# Patient Record
Sex: FEMALE | Race: WHITE
[De-identification: names, ages, dates, MRNs, and addresses within clinical notes are randomized per-mention and may not be internally consistent; named-entity substitution may affect disease eponyms.]

---

## 2017-04-16 ENCOUNTER — HOSPITAL ENCOUNTER (EMERGENCY)
Dept: HOSPITAL 75 - ER | Age: 57
Discharge: HOME | End: 2017-04-16
Payer: COMMERCIAL

## 2017-04-16 VITALS — WEIGHT: 160 LBS | BODY MASS INDEX: 25.71 KG/M2 | HEIGHT: 66 IN

## 2017-04-16 VITALS — SYSTOLIC BLOOD PRESSURE: 118 MMHG | DIASTOLIC BLOOD PRESSURE: 81 MMHG

## 2017-04-16 DIAGNOSIS — J40: ICD-10-CM

## 2017-04-16 DIAGNOSIS — J06.9: Primary | ICD-10-CM

## 2017-04-16 DIAGNOSIS — F17.210: ICD-10-CM

## 2017-04-16 PROCEDURE — 99282 EMERGENCY DEPT VISIT SF MDM: CPT

## 2017-04-16 PROCEDURE — 71020: CPT

## 2017-04-16 PROCEDURE — 87430 STREP A AG IA: CPT

## 2017-04-16 PROCEDURE — 94640 AIRWAY INHALATION TREATMENT: CPT

## 2017-04-16 NOTE — DIAGNOSTIC IMAGING REPORT
INDICATION: Cough, congestion and wheezing



EXAMINATION: PA and lateral views of the chest.



FINDINGS: The heart size and vascularity are normal. Lungs are

clear. There is no effusion. There is no acute bony abnormality.



IMPRESSION: No acute abnormality is seen.



Dictated by:



Dictated on workstation # [id number]

## 2017-04-16 NOTE — ED COUGH/URI
General


Chief Complaint:  Cough/Cold/Flu Symptoms


Stated Complaint:  COUGH SORE THROAT FEVER


Source:  patient


Exam Limitations:  no limitations





History of Present Illness


Time seen by provider:  20:02


Initial Comments


Patient presents with a few weeks of cough, shortness of breath, sore throat, 

hoarseness, no fever or chills but some post tussive emesis. She smokes one 

pack a day states that she does not have COPD. She is followed recently with 

[facility]. No sick contacts or travel outside the [place] 

[place]. No skin rash. She has had headaches for the past 2 or 3 days and 

a fullness in her ears.





Allergies and Home Medications


Allergies


Coded Allergies:  


     No Known Drug Allergies (Unverified , 4/16/17)





Home Medications


Clonazepam 0.5 Mg Tablet, 0.5 MG PO BID PRN for prn, (Reported)


Cyclobenzaprine HCl 10 Mg Tablet, 10 MG PO BID, #60 (Reported)


Diclofenac/Misoprostol 1 Tab Tab, Unknown Dose PO UD, (Reported)


Gabapentin 600 Mg Tablet, 600 MG PO TID PRN for prn, (Reported)


Oxycodone HCl/Acetaminophen 1 Each Tablet, 1 EACH PO DAILY PRN for prn, (

Reported)





Constitutional:  No chills, No diaphoresis, No fever, malaise


EENTM:  hoarseness, mouth swelling, tearing, throat swelling, No ear discharge, 

No ear pain


Respiratory:  cough, phlegm, short of breath


Cardiovascular:  No Hx of Intervention, No palpitations, No syncope


Gastrointestinal:  No abdominal pain, No constipation, No diarrhea, nausea, No 

vomiting


Genitourinary:  No dysuria, No hematuria


Skin:  No pruritus, No rash





Past Medical-Social-Family Hx


Patient Social History


Alcohol Use:  Rarely Uses


Smoking Status:  Current Everyday Smoker (1 ppd)


Type Used:  Cigarettes


Recent Foreign Travel:  No


Contact w/Someone Who Travel:  No





Physical Exam


Vital Signs





Vital Sign - Last 12Hours








 4/16/17





 19:53


 


Temp 97.4


 


Pulse 81


 


Resp 20


 


B/P (MAP) 137/90


 


Pulse Ox 99


 


O2 Delivery Room Air





Capillary Refill :


General Appearance:  WD/WN, mild distress


Eyes:  Bilateral Eye EOMI, Bilateral Eye Normal Inspection, Bilateral Eye PERRL


HEENT:  TM abnormal (L) (retracted), pharyngeal erythema


Neck:  full range of motion, supple, normal inspection, tender midline


Respiratory:  chest non-tender, normal breath sounds, wheezing (few scattered)


Cardiovascular:  normal peripheral pulses, regular rate, rhythm, no edema, no 

murmur


Gastrointestinal:  normal bowel sounds, non tender, soft


Neurologic/Psychiatric:  alert, oriented x 3


Skin:  normal color, warm/dry





Progress/Results/Core Measures


Results/Orders


Lab Results





Laboratory Tests








Test


  4/16/17


20:05 Range/Units


 


 


Group A Streptococcus Screen NEGATIVE  NEGATIVE  








My Orders





Orders - SHREYAS ORTEGA


Rapid Strep A Screen (4/16/17 20:12)


Chest Pa/Lat (2 View) (4/16/17 20:12)


Albuterol Pre-Mix Nebs (Rt) (Proventil P (4/16/17 20:15)





Vital Signs/I&O





Vital Sign - Last 12Hours








 4/16/17 4/16/17 4/16/17





 19:53 19:53 20:20


 


Temp  97.4 


 


Pulse  81 


 


Resp  20 


 


B/P (MAP)  137/90 


 


Pulse Ox  99 98


 


O2 Delivery Room Air Room Air 








Progress Note :  


   Time:  20:11


Progress Note


Patient with long-standing smoking history now with upper respirator symptoms 

and some wheezing. We'll obtain a chest x-ray gave her a breathing treatment 

and send her out on appropriate therapy. Flonase for her eustachian 

dysfunction. Strep screen





Diagnostic Imaging





   Diagonstic Imaging:  Xray


   Plain Films/CT/US/NM/MRI:  chest


Comments


10 ribs above the non-flattened diaphragm with normal looking lung parenchyma. 

No signs of edema or other cardiopulmonary abnormality acutely. She does not 

have any other osseous or soft tissue findings acutely.


   Reviewed:  Reviewed by Me





Departure


Impression


Impression:  


 Primary Impression:  


 Upper respiratory infection


 Qualified Codes:  J06.9 - Acute upper respiratory infection, unspecified; 

B97.89 - Other viral agents as the cause of diseases classified elsewhere


 Additional Impression:  


 Bronchitis


Disposition:  01 HOME, SELF-CARE


Condition:  Stable





Departure-Patient Inst.


Decision time for Depature:  20:35


Referrals:  


JESSEE BUTT MD (PCP)


Primary Care Physician


Patient Instructions:  Acute Bronchitis, Adult (DC)





Add. Discharge Instructions:  


You have been diagnosed with an upper respiratory tract infection most likely 

viral. There is been a negative strep screen however in 2 days you can call 

back up to the hospital and get the results of the culture to see if it was 

indeed positive for strep. If this is the case then you should fill the 

antibiotic prescription that was sent out, azithromycin and take it as 

prescribed. If you're getting worse symptoms of the next 2-3 days then you 

should fill the azithromycin as well. Otherwise you have albuterol to be used 

if you're having wheezing or tightness in her chest. You have Tessalon Perles 

be taken every 6 hours if you're having a cough. You've also been given a short 

course of prednisone to be taken over the next 5 days. It is okay to spread it 

out every other day if you're feeling wired on the redness on as this is a 

normal side effect to steroids in general. The symptoms usually resolve in 7-14 

days. If you're having new or worsening or worrisome symptoms you should return 

to the ER or to your primary care physician.








All discharge instructions reviewed with patient and/or family. Voiced 

understanding.


Scripts


Benzonatate (Tessalon Perle) 100 Mg Capsule


100 MG PO Q6H Y for COUGH, #30 CAP 0 Refills


   Prov: SHREYAS ORTEGA         4/16/17 


Albuterol Sulfate (PROAIR HFA) 1 Puff Puff


2 PUFF IH Q4H Y for WHEEZING, #1 EACH 0 Refills


   1 PUFF = 90 MCG


   Prov: SHREYAS ORTEGA         4/16/17 


Prednisone (Prednisone) 20 Mg Tab


20 MG PO DAILY for 5 Days, #5 TAB 0 Refills


   Prov: SHREYAS ORTEGA         4/16/17 


Azithromycin (Azithromycin) 250 Mg Tablet


250 MG PO UD, #6 TAB 0 Refills


   TAKE 2 TABLETS ON DAY ONE THEN TAKE 1 TABLET DAILY FOR FOUR MORE DAYS


   Prov: SHREYAS ORTEGA         4/16/17





Copy


Copies To 1:   MADISON BURCIAGA DO











SHREYAS ORTEGA Apr 16, 2017 20:12

## 2017-05-07 ENCOUNTER — HOSPITAL ENCOUNTER (EMERGENCY)
Dept: HOSPITAL 75 - ER | Age: 57
Discharge: HOME | End: 2017-05-07
Payer: MEDICAID

## 2017-05-07 VITALS — SYSTOLIC BLOOD PRESSURE: 140 MMHG | DIASTOLIC BLOOD PRESSURE: 81 MMHG

## 2017-05-07 VITALS — WEIGHT: 150 LBS | HEIGHT: 66 IN | BODY MASS INDEX: 24.11 KG/M2

## 2017-05-07 DIAGNOSIS — J40: Primary | ICD-10-CM

## 2017-05-07 DIAGNOSIS — Z79.899: ICD-10-CM

## 2017-05-07 DIAGNOSIS — F17.210: ICD-10-CM

## 2017-05-07 PROCEDURE — 71020: CPT

## 2017-05-07 PROCEDURE — 94640 AIRWAY INHALATION TREATMENT: CPT

## 2017-05-07 PROCEDURE — 87804 INFLUENZA ASSAY W/OPTIC: CPT

## 2017-05-07 PROCEDURE — 99282 EMERGENCY DEPT VISIT SF MDM: CPT

## 2017-05-07 NOTE — ED COUGH/URI
General


Chief Complaint:  Cough/Cold/Flu Symptoms


Stated Complaint:  COUGH


Nursing Triage Note:  


ILL SINCE THURSDAY, COUGHING, FEVER.  COUGHING UP GREEN.  COMPLETED STEROIDS 

AND 


ANTIBIOTICS


Source:  patient





History of Present Illness


Time seen by provider:  11:05


Initial Comments


C/O PRODUCTIVE COUGH X 2 WEEKS--GREEN SPUTUM


PT HAS HAD SUBJECTIVE FEVER AND SWEATS SINCE THURSDAY 


C/O SHORTNESS OF BREATH AND WHEEZING


WAS SEEN HERE 04/16/17 FOR THIS PROBLEM AND WAS GIVEN RX FOR PREDNISONE, 

TESSALON, ALBUTEROL AND ZITHROMAX--STATES SYMPTOMS GOT BETTER, THEN WORSENED 

WHEN MEDICATIONS WERE FINISHED





PCP: DR. RAJEEV BUTT





Allergies and Home Medications


Allergies


Coded Allergies:  


     morphine (Unverified  Allergy, Unknown, 5/7/17)





Home Medications


Albuterol Sulfate 1 Puff Puff, 2 PUFF IH Q4H PRN for WHEEZING, #1 Ref 0


   1 PUFF = 90 MCG 


   Prescribed by: SHREYAS ORTEGA on 4/16/17 2049


Azithromycin 250 Mg Tablet, 250 MG PO UD, #6 Ref 0


   TAKE 2 TABLETS ON DAY ONE THEN TAKE 1 TABLET DAILY FOR FOUR MORE DAYS 


   Prescribed by: SHREYAS ORTEGA on 4/16/17 2049


Benzonatate 100 Mg Capsule, 100 MG PO Q6H PRN for COUGH, #30 Ref 0


   Prescribed by: SHREYAS ORTEGA on 4/16/17 2049


Clonazepam 0.5 Mg Tablet, 0.5 MG PO BID PRN for prn, (Reported)


Cyclobenzaprine HCl 10 Mg Tablet, 10 MG PO BID, #60 (Reported)


Diclofenac/Misoprostol 1 Tab Tab, Unknown Dose PO UD, (Reported)


Gabapentin 600 Mg Tablet, 600 MG PO TID PRN for prn, (Reported)


Ondansetron HCl 4 Mg Tab, 4 MG PO Q6H PRN for NAUSEA/VOMITING-1ST LINE, #20 Ref 

0


   Prescribed by: SHREYAS ORTEGA on 4/16/17 2049


Oxycodone HCl/Acetaminophen 1 Each Tablet, 1 EACH PO DAILY PRN for prn, (

Reported)


Prednisone 20 Mg Tab, 20 MG PO DAILY for 5 Days, #5 Ref 0


   Prescribed by: SHREYAS ORTEGA on 4/16/17 2049





Constitutional:  see HPI, fever


EENTM:  nose congestion, see HPI


Respiratory:  see HPI, cough, short of breath, wheezing


Cardiovascular:  no symptoms reported


Gastrointestinal:  no symptoms reported


Genitourinary:  no symptoms reported


Musculoskeletal:  no symptoms reported


Skin:  no symptoms reported


Psychiatric/Neurological:  No Symptoms Reported


Hematologic/Lymphatic:  No Symptoms Reported


Immunological/Allergic:  no symptoms reported





Past Medical-Social-Family Hx


Patient Social History


Alcohol Use:  Rarely Uses


Recreational Drug Use:  No (PAST HISTORY)


Smoking Status:  Current Everyday Smoker (1 PPD)


Type Used:  Cigarettes


Recent Foreign Travel:  No


Contact w/Someone Who Travel:  No


Recent Infectious Disease Expo:  No


Recent Hopitalizations:  No





Immunizations Up To Date


Tetanus Booster (TDap):  Unknown


PED Vaccines UTD:  Yes





Seasonal Allergies


Seasonal Allergies:  No





Surgeries


HX Surgeries:  Yes (BACK, ROTATOR CUFF)


Surgeries:  Appendectomy, Orthopedic





Respiratory


Hx Respiratory Disorders:  No





Cardiovascular


Hx Cardiac Disorders:  No





Neurological


Hx Neurological Disorders:  No





Reproductive System


Hx Reproductive Disorders:  No





Genitourinary


Hx Genitourinary Disorders:  No





Gastrointestinal


Hx Gastrointestinal Disorders:  No





Musculoskeletal


Hx Musculoskeletal Disorders:  Yes


Musculoskeletal Disorders:  Arthritis, Chronic Back Pain





Endocrine


Hx Endocrine Disorders:  No





HEENT


HX ENT Disorders:  No





Cancer


Hx Cancer:  No





Psychosocial


Hx Psychiatric Problems:  No





Integumentary


HX Skin/Integumentary Disorder:  No





Blood Transfusions


Hx Blood Disorders:  No





Physical Exam


Vital Signs





Vital Sign - Last 12Hours








 5/7/17





 11:06


 


Temp 98.3


 


Pulse 93


 


Resp 24


 


B/P (MAP) 145/100


 


Pulse Ox 96


 


O2 Delivery Room Air





Capillary Refill : Less Than 3 Seconds


General Appearance:  WD/WN, no apparent distress, other (CONTINUOUS HARSH COUGH

, + ODOR OF CIGARETTES)


HEENT:  PERRL/EOMI, other (NASAL MUCOSAL EDEMA)


Respiratory:  no respiratory distress, no accessory muscle use, wheezing (

AUDIBLE EXPIRATORY WHEEZING), other (FREQUENT TIGHT/HARSH COUGH)


Cardiovascular:  regular rate, rhythm, no murmur


Gastrointestinal:  non tender, soft


Extremities:  no pedal edema, normal capillary refill


Neurologic/Psychiatric:  CNs II-XII nml as tested, no motor/sensory deficits, 

alert, oriented x 3, other (ANXIOUS)


Skin:  normal color, warm/dry





Progress/Results/Core Measures


Results/Orders


Micro Results





Microbiology


5/7/17 Influenza Types A,B Antigen (MARILEE) - Final, Complete


         





My Orders





Orders - MELANY WARNER DO


Albuterol/Ipra Inhalation Soln (Duoneb I (5/7/17 11:05)


Albuterol Pre-Mix Nebs (Rt) (Proventil P (5/7/17 11:07)


Prednisone Tablet (Deltasone Tablet) (5/7/17 11:15)


Chest Pa/Lat (2 View) (5/7/17 11:11)


Influenza A And B Antigens (5/7/17 11:11)


Albuterol/Ipra Inhalation Soln (Duoneb I (5/7/17 11:15)


Dexamethasone Injection (Decadron Inject (5/7/17 11:15)


Rt Request For Service (5/7/17 11:11)


Svn Sm Volume Nebulizer Rt-Rfs (5/7/17 11:11)


Dexamethasone Pf Injection (Decadron Pf (5/7/17 11:07)


Ondansetron  Oral Dissolve Tab (Zofran (5/7/17 11:30)


Ondansetron  Oral Dissolve Tab (Zofran (5/7/17 11:15)





Medications Given in ED





Current Medications








 Medications  Dose


 Ordered  Sig/Kaden


 Route  Start Time


 Stop Time Status Last Admin


Dose Admin


 


 Albuterol Sulfate  2.5 mg  STK-MED ONCE


 .ROUTE  5/7/17 11:07


 5/7/17 11:11 DC 5/7/17 11:22


2.5 MG


 


 Albuterol/


 Ipratropium  3 ml  ONCE  ONCE


 INH  5/7/17 11:15


 5/7/17 11:16 DC 5/7/17 11:19


3 ML


 


 Dexamethasone


 Sodium Phosphate  20 mg  ONCE  ONCE


 IH  5/7/17 11:15


 5/7/17 11:16 DC 5/7/17 11:22


20 MG


 


 Ondansetron HCl  4 mg  ONCE  ONCE


 PO  5/7/17 11:30


 5/7/17 11:31 DC 5/7/17 11:21


4 MG








Vital Signs/I&O





Vital Sign - Last 12Hours








 5/7/17 5/7/17 5/7/17





 11:06 11:06 11:23


 


Temp  98.3 


 


Pulse  93 


 


Resp  24 


 


B/P (MAP)  145/100 


 


Pulse Ox  96 95


 


O2 Delivery Room Air  














Blood Pressure Mean:  115








Progress Note :  


Progress Note


LUNGS ESSENTIALLY CLEAR AFTER NEB TREATMENT AND COUGH DECREASED





DOES HAVE ACCESS TO NEBULIZER AT HOME, AND WOULD LIKE RX FOR MEDICATION FOR IT





Diagnostic Imaging





Comments


CXR--NO ACUTE PROCESS, PER RADIOLOGIST REPORT @ 1140


   Reviewed:  Reviewed by Me





Departure


Impression


Impression:  


 Primary Impression:  


 Bronchitis


Disposition:  01 HOME, SELF-CARE


Condition:  Improved





Departure-Patient Inst.


Referrals:  


JESSEE BUTT MD (PCP/Family)


Primary Care Physician


Patient Instructions:  Acute Bronchitis, Adult (DC)





Add. Discharge Instructions:  


NO SMOKING





TYLENOL AND MOTRIN AS NEEDED FOR PAIN OR FEVER





FOLLOW UP WITH DR. BUTT IN 2-3 DAYS FOR FURTHER CARE





All discharge instructions reviewed with patient and/or family. Voiced 

understanding.


Scripts


Benzonatate (Tessalon Perle) 100 Mg Capsule


1-2 TAB PO TID for Cough, #30 CAP


   Prov: MELANY WARNER DO         5/7/17 


Budesonide (Pulmicort) 1 Mg/2 Ml Ampul.neb


1 MG IH BID, #1 INHALER


   Prov: MELANY WARNER DO         5/7/17 


D-Methorphan Hb/Prometh HCl (Promethazine-Dm Syrup) 118 Ml Syrup


1-2 TSP PO Q4H for Cough, #120 ML


   Prov: MELANY WARNER DO         5/7/17 


Methylprednisolone (Medrol) 4 Mg Tab.ds.pk


4 MG PO UD, #1 PKG


   Prov: MELANY WARNER DO         5/7/17 


Levofloxacin (Levaquin) 500 Mg Tablet


500 MG PO DAILY for INFECTION, #10 TAB


   Prov: MELANY WARNER DO         5/7/17 


Albuterol Sulfate (Albuterol Sulfate) 2.5 Mg/3 Ml Vial.neb


2.5 MG IH Q4H, #1 EA


   Prov: MELANY WARNER DO         5/7/17











MELANY WARNER DO May 7, 2017 11:29

## 2017-05-07 NOTE — DIAGNOSTIC IMAGING REPORT
EXAMINATION: PA and lateral chest at 1140 AM



INDICATION: Cough



The heart size is within normal limits and stable when compared

to 4/16/17. The lungs are clear. There is still no evidence for

failure, pneumonia or for a pleural effusion to suggest an acute

abnormality. There is a very small area of slightly increased

density overlying the left lung base on the PA view. This is

probably secondary to superimposition.



The mediastinum is not widened. The osseous structures are

intact.



IMPRESSION: There is no evidence for an acute cardiopulmonary

abnormality.



Dictated by: 



  Dictated on workstation # [id number]

## 2017-08-25 ENCOUNTER — HOSPITAL ENCOUNTER (OUTPATIENT)
Dept: HOSPITAL 75 - RAD | Age: 57
End: 2017-08-25
Attending: FAMILY MEDICINE
Payer: MEDICAID

## 2017-08-25 DIAGNOSIS — Z12.31: Primary | ICD-10-CM

## 2017-08-25 PROCEDURE — 77067 SCR MAMMO BI INCL CAD: CPT

## 2017-09-12 NOTE — DIAGNOSTIC IMAGING REPORT
Bilateral screening mammogram 2D views with tomosynthesis



The current study was also evaluated with a Computer Aided

Detection (CAD) system.



Indication: Screening. No current complaints stated on the

questionnaire.



COMPARISON: 3/19/15



FINDINGS:

The breasts are composed of heterogeneously dense parenchyma

which may decrease mammographic sensitivity. There are

benign-appearing calcifications seen. Allowing for technique and

positional differences, no suspicious change is seen.



IMPRESSION: No significant change.



ACR BI-RADS Category 2: Benign findings.

Result letter will be mailed to the patient.

Note: At least 10% of breast cancer is not imaged by mammography.



Dictated by: 



  Dictated on workstation # [id number]

## 2017-12-03 ENCOUNTER — HOSPITAL ENCOUNTER (EMERGENCY)
Dept: HOSPITAL 75 - ER | Age: 57
Discharge: HOME | End: 2017-12-03
Payer: MEDICAID

## 2017-12-03 VITALS — BODY MASS INDEX: 24.11 KG/M2 | WEIGHT: 150 LBS | HEIGHT: 66 IN

## 2017-12-03 VITALS — DIASTOLIC BLOOD PRESSURE: 89 MMHG | SYSTOLIC BLOOD PRESSURE: 124 MMHG

## 2017-12-03 DIAGNOSIS — Z90.49: ICD-10-CM

## 2017-12-03 DIAGNOSIS — M19.90: ICD-10-CM

## 2017-12-03 DIAGNOSIS — L02.413: Primary | ICD-10-CM

## 2017-12-03 PROCEDURE — 99282 EMERGENCY DEPT VISIT SF MDM: CPT

## 2017-12-03 PROCEDURE — 87186 SC STD MICRODIL/AGAR DIL: CPT

## 2017-12-03 PROCEDURE — 87070 CULTURE OTHR SPECIMN AEROBIC: CPT

## 2017-12-03 PROCEDURE — 87205 SMEAR GRAM STAIN: CPT

## 2017-12-03 PROCEDURE — 87077 CULTURE AEROBIC IDENTIFY: CPT

## 2017-12-03 NOTE — XMS REPORT
[facility]

 Created on: 08/15/2017



Cabrera Arvizu

External Reference #: [id number]

: 1960

Sex: Female



Demographics







 Address  [address] [place]  [address]

 

 Preferred Language  Unknown

 

 Marital Status  Unknown

 

 Restoration Affiliation  Unknown

 

 Race  Unknown

 

 Ethnic Group  Unknown





Author







 Author  LICO SILVA

 

 [facility]

 

 Address  [address] [place]  [address]



 

 Phone  [phone]







Care Team Providers







 Care Team Member Name  Role  Phone

 

 LICO SILVA  Unavailable  [phone]







PROBLEMS







 Type  Condition  ICD9-CM Code  IAB70-WG Code  Onset Dates  Condition Status  
SNOMED Code

 

 Problem  Establishing care with new doctor, encounter for     Z71.89     
Active  [id number]

 

 Problem  Depression     F32.9     Active  16805859

 

 Problem  Stress headaches     F45.41     Active  91029490

 

 Problem  Chronic pain due to injury     G89.21     Active  535124991

 

 Problem  Anxiety     F41.9     Active  10456181







ALLERGIES

Unknown Allergies



SOCIAL HISTORY

No smoking Hx information available



PLAN OF CARE





VITAL SIGNS





MEDICATIONS







 Medication  Instructions  Dosage  Frequency  Start Date  End Date  Duration  
Status

 

 Klonopin 0.5 MG  Orally Twice a day  1 tablet  12h        28 days  Active







RESULTS

No Results



PROCEDURES

No Known procedures



IMMUNIZATIONS

No Known Immunizations
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
## 2017-12-03 NOTE — XMS REPORT
[facility]

 Created on: 2017



Nolberto  Cabrera

External Reference #: [id number]

: 1960

Sex: Female



Demographics







 Address  [address] [place]  [address]

 

 Preferred Language  Unknown

 

 Marital Status  Unknown

 

 Roman Catholic Affiliation  Unknown

 

 Race  Unknown

 

 Ethnic Group  Unknown





Author







 Author  LICO SILVA

 

 [facility]

 

 Address  [address] [place]  [address]



 

 Phone  [phone]







Care Team Providers







 Care Team Member Name  Role  Phone

 

 LICO SILVA  Unavailable  [phone]







PROBLEMS







 Type  Condition  ICD9-CM Code  SJJ31-TM Code  Onset Dates  Condition Status  
SNOMED Code

 

 Problem  Establishing care with new doctor, encounter for     Z71.89     
Active  [id number]

 

 Problem  Depression     F32.9     Active  46273331

 

 Problem  Stress headaches     F45.41     Active  62224837

 

 Problem  Chronic pain due to injury     G89.21     Active  954902358

 

 Problem  Anxiety     F41.9     Active  74722655







ALLERGIES







 Substance  Reaction  Event Type  Date  Status

 

 Morphine Sulfate  nausea  Drug Allergy  27 Dec, 2016  Active







SOCIAL HISTORY

No smoking Hx information available



PLAN OF CARE







 Activity  Details









  









 Follow Up  prn Reason:







VITAL SIGNS







 Height  65 in  2016

 

 Weight  167.2 lbs  2016

 

 Temperature  98.4 degrees Fahrenheit  2016

 

 Heart Rate  80 bpm  2016

 

 Respiratory Rate  20   2016

 

 BMI  27.82 kg/m2  2016

 

 Blood pressure systolic  114 mmHg  2016

 

 Blood pressure diastolic  80 mmHg  2016







MEDICATIONS







 Medication  Instructions  Dosage  Frequency  Start Date  End Date  Duration  
Status

 

 Cyclobenzaprine HCl 10 MG     TAKE ONE TABLET BY MOUTH TWICE DAILY           
30  Active

 

 Klonopin 0.5 MG  Orally Twice a day  1 tablet  12h        28 days  Active

 

 Arthrotec 75-0.2 MG  Orally Once a day  1 tablet  24h  25 Oct, 2016     90 
days  Active

 

 Gabapentin 800 MG  Orally 3 times a day  1 tablet  8h        30 days  Active

 

 Cymbalta 60 mg  Orally Once a day  1 capsule  24h       90 days  
Active

 

 Hydrocodone-Acetaminophen 5-325 MG  Orally Once a day  1 tablet as needed  24h
  13 Dec, 2016     28 days  Active







RESULTS







 Name  Result  Date  Reference Range

 

 PDF Report     2016   

 

 PDF Report1  [facility]      

 

 PAP TEST W/ HPV REGARDLESS     2016   

 

 DIAGNOSIS:         

 

 Specimen adequacy:         

 

 Clinician provided ICD10:         

 

 Performed by:         

 

 .  .      

 

 Note:         

 

 HPV, high-risk  Negative     Negative

 

 HEMOCCULT (IN HOUSE)     2016   

 

 RESULTS  Negative      

 

 Control  +      

 

 Lot #  [id number]      

 

 Exp date  Oct 2017      

 

 Mammogram, Bilateral Screening     2017   







PROCEDURES







 Procedure  Date Ordered  Related Diagnosis  Body Site

 

 SPECIMEN HANDLING  Dec 27, 2016      

 

 TEST FOR BLOOD, FECES  Dec 27, 2016      

 

 Office Visit, Est Pt., Level 4  Dec 27, 2016      







IMMUNIZATIONS

No Known Immunizations

## 2017-12-03 NOTE — XMS REPORT
[facility]

 Created on: 2017



Cabrera Arvizu

External Reference #: [id number]

: 1960

Sex: Female



Demographics







 Address  [address] [place]  [address]

 

 Preferred Language  Unknown

 

 Marital Status  Unknown

 

 Jewish Affiliation  Unknown

 

 Race  Unknown

 

 Ethnic Group  Unknown





Author







 Author  LICO SILVA

 

 [facility]

 

 Address  [address] [place]  [address]



 

 Phone  [phone]







Care Team Providers







 Care Team Member Name  Role  Phone

 

 LICO SILVA  Unavailable  [phone]







PROBLEMS







 Type  Condition  ICD9-CM Code  QTD77-HS Code  Onset Dates  Condition Status  
SNOMED Code

 

 Problem  Establishing care with new doctor, encounter for     Z71.89     
Active  [id number]

 

 Problem  Depression     F32.9     Active  01440982

 

 Problem  Stress headaches     F45.41     Active  04497867

 

 Problem  Chronic pain due to injury     G89.21     Active  222008674

 

 Problem  Anxiety     F41.9     Active  24370596







ALLERGIES

Unknown Allergies



SOCIAL HISTORY

No smoking Hx information available



PLAN OF CARE





VITAL SIGNS





MEDICATIONS

Unknown Medications



RESULTS

No Results



PROCEDURES

No Known procedures



IMMUNIZATIONS

No Known Immunizations

## 2017-12-03 NOTE — ED INTEGUMENTARY GENERAL
General


Chief Complaint:  Skin/Wound Problems


Stated Complaint:  R ARM KNOT/WOUND


Source:  patient


Exam Limitations:  no limitations





History of Present Illness


Time seen by provider:  11:13


Initial Comments


ER with a one-week history of bumps to the right armpit that are very painful 

and red.  A few weeks ago she had a similar presentation to the left armpit.  

No fevers or chills.


Timing/Duration:  constant


Severity:  moderate





Allergies and Home Medications


Allergies


Coded Allergies:  


     morphine (Unverified  Allergy, Unknown, 5/7/17)





Home Medications


Albuterol Sulfate 1 Puff Puff, 2 PUFF IH Q4H PRN for WHEEZING, #1 Ref 0


   1 PUFF = 90 MCG 


   Prescribed by: SHREYAS ORTEGA on 4/16/17 2049


Albuterol Sulfate 2.5 Mg/3 Ml Vial.neb, 2.5 MG IH Q4H, #1


   Prescribed by: MELANY WARNER on 5/7/17 1154


Azithromycin 250 Mg Tablet, 250 MG PO UD, #6 Ref 0


   TAKE 2 TABLETS ON DAY ONE THEN TAKE 1 TABLET DAILY FOR FOUR MORE DAYS 


   Prescribed by: SHREYAS ORTEGA on 4/16/17 2049


Benzonatate 100 Mg Capsule, 100 MG PO Q6H PRN for COUGH, #30 Ref 0


   Prescribed by: SHREYAS ORTEGA on 4/16/17 2049


Benzonatate 100 Mg Capsule, 1-2 TAB PO TID, #30


   Prescribed by: MELANY WARNER on 5/7/17 1154


Budesonide 1 Mg/2 Ml Ampul.neb, 1 MG IH BID, #1


   Prescribed by: MELANY WARNER on 5/7/17 1154


Clonazepam 0.5 Mg Tablet, 0.5 MG PO BID PRN for prn, (Reported)


Cyclobenzaprine HCl 10 Mg Tablet, 10 MG PO BID, #60 (Reported)


D-Methorphan Hb/Prometh HCl 118 Ml Syrup, 1-2 TSP PO Q4H, #120


   Prescribed by: MELANY WARNER on 5/7/17 1154


Diclofenac/Misoprostol 1 Tab Tab, Unknown Dose PO UD, (Reported)


Gabapentin 600 Mg Tablet, 600 MG PO TID PRN for prn, (Reported)


Levofloxacin 500 Mg Tablet, 500 MG PO DAILY, #10


   Prescribed by: MELANY WARNER on 5/7/17 1154


Methylprednisolone 4 Mg Tab.ds.pk, 4 MG PO UD, #1


   Prescribed by: MELANY WARNER on 5/7/17 1154


Ondansetron HCl 4 Mg Tab, 4 MG PO Q6H PRN for NAUSEA/VOMITING-1ST LINE, #20 Ref 

0


   Prescribed by: SHREYAS ORTEGA on 4/16/17 2049


Oxycodone HCl/Acetaminophen 1 Each Tablet, 1 EACH PO DAILY PRN for prn, (

Reported)


Prednisone 20 Mg Tab, 20 MG PO DAILY for 5 Days, #5 Ref 0


   Prescribed by: SHREYAS ORTEGA on 4/16/17 2049


Sulfamethoxazole/Trimethoprim 1 Each Tablet, 1 EACH PO BID, #14


   Prescribed by: RACQUEL MUÑIZ on 12/3/17 1116





Constitutional:  see HPI


EENTM:  see HPI


Respiratory:  no symptoms reported


Cardiovascular:  no symptoms reported


Genitourinary:  no symptoms reported


Musculoskeletal:  no symptoms reported


Skin:  see HPI


Psychiatric/Neurological:  No Symptoms Reported


Endocrine:  No Symptoms Reported





Past Medical-Social-Family Hx


Patient Social History


Type Used:  Cigarettes


Recent Foreign Travel:  No


Contact w/Someone Who Travel:  No


Recent Hopitalizations:  No





Immunizations Up To Date


Tetanus Booster (TDap):  Unknown


PED Vaccines UTD:  Yes





Seasonal Allergies


Seasonal Allergies:  No





Surgeries


Surgeries:  Appendectomy, Orthopedic





Reproductive System


Hx Reproductive Disorders:  No





Musculoskeletal


Musculoskeletal Disorders:  Arthritis, Chronic Back Pain





Physical Exam


Vital Signs





Vital Sign - Last 12Hours








 12/3/17





 11:13


 


Temp 97.0


 


Pulse 80


 


Resp 22


 


B/P (MAP) 124/89 (101)


 


Pulse Ox 100


 


O2 Delivery Room Air





Capillary Refill :


General Appearance:  WD/WN, no apparent distress


HEENT:  PERRL/EOMI, normal ENT inspection


Neck:  non-tender, full range of motion


Respiratory:  no respiratory distress, no accessory muscle use


Gastrointestinal:  non tender, soft


Neurologic/Psychiatric:  alert, normal mood/affect, oriented x 3


Skin:  normal color, warm/dry


Skin Problem Location:  upper extremities


Skin Problem Character:  abscess





I&D :  


   Blade Size:  11


Progress


Anesthetized with 1 mL of 2 percent lidocaine without epinephrine.  Wound then 

incised with an 11 blade scalpel.  Moderate amount of purulent material 

expressed.  Culture collected and sent to lab.  Wound gently irrigated with 

Betadine/saline solution.  Covered with gauze.





Progress/Results/Core Measures


Results/Orders


My Orders





Orders - RACQUEL MUÑIZ


Lidocaine 2% Injection 20 Ml (Xylocaine (12/3/17 11:15)


Wound Culture (12/3/17 11:11)





Medications Given in ED





Current Medications








 Medications  Dose


 Ordered  Sig/Kaden


 Route  Start Time


 Stop Time Status Last Admin


Dose Admin


 


 Lidocaine HCl  2 ml  ONCE  ONCE


 INJ  12/3/17 11:15


 12/3/17 11:16 DC 12/3/17 11:24


2 ML








Vital Signs/I&O





Vital Sign - Last 12Hours








 12/3/17 12/3/17





 11:13 11:24


 


Temp 97.0 97.0


 


Pulse 80 


 


Resp 22 


 


B/P (MAP) 124/89 (101) 


 


Pulse Ox 100 


 


O2 Delivery Room Air 











Departure


Impression


Impression:  


 Primary Impression:  


 Abscess


Disposition:  01 HOME, SELF-CARE


Condition:  Stable





Departure-Patient Inst.


Decision time for Depature:  11:14


Referrals:  


BRIANNA RICO DO (PCP/Family)


Primary Care Physician


Patient Instructions:  Abscess Incision and Drainage (DC)





Add. Discharge Instructions:  


1.  Return to ER for any concerns such as increasing redness pain or fevers.  

Take antibiotics as directed.  Follow-up with your doctor next week


All discharge instructions reviewed with patient and/or family. Voiced 

understanding.


Scripts


Sulfamethoxazole/Trimethoprim (Bactrim Ds Tablet) 1 Each Tablet


1 EACH PO BID, #14 TAB


   Prov: RACQUEL MUÑIZ         12/3/17











RACQUEL MUÑIZ Dec 3, 2017 11:16

## 2017-12-03 NOTE — XMS REPORT
[facility]

 Created on: 2017



Nolberto  Cabrera

External Reference #: [id number]

: 1960

Sex: Female



Demographics







 Address  [address] [place]  [address]

 

 Preferred Language  Unknown

 

 Marital Status  Unknown

 

 Mosque Affiliation  Unknown

 

 Race  Unknown

 

 Ethnic Group  Unknown





Author







 Author  LICO SILVA

 

 [facility]

 

 Address  [address] [place]  [address]



 

 Phone  [phone]







Care Team Providers







 Care Team Member Name  Role  Phone

 

 LICO SILVA  Unavailable  [phone]







PROBLEMS







 Type  Condition  ICD9-CM Code  UQM75-UU Code  Onset Dates  Condition Status  
SNOMED Code

 

 Problem  Establishing care with new doctor, encounter for     Z71.89     
Active  [id number]

 

 Problem  Depression     F32.9     Active  [id number]

 

 Problem  Stress headaches     F45.41     Active  18554426

 

 Problem  Chronic pain due to injury     G89.21     Active  285509020

 

 Problem  Anxiety     F41.9     Active  [id number]







ALLERGIES







 Substance  Reaction  Event Type  Date  Status

 

 Morphine Sulfate  nausea  Drug Allergy  14 Dec, 2016  Active







SOCIAL HISTORY

No smoking Hx information available



PLAN OF CARE







 Activity  Details









  









 Follow Up  4 Months Reason:Pain mgmt







VITAL SIGNS







 Height  65 in  2016

 

 Weight  167.1 lbs  2016

 

 Temperature  97.9 degrees Fahrenheit  2016

 

 Heart Rate  84 bpm  2016

 

 Respiratory Rate  20   2016

 

 BMI  27.80 kg/m2  2016

 

 Blood pressure systolic  122 mmHg  2016

 

 Blood pressure diastolic  82 mmHg  2016







MEDICATIONS







 Medication  Instructions  Dosage  Frequency  Start Date  End Date  Duration  
Status

 

 Gabapentin 800 MG  Orally 3 times a day  1 tablet  8h        30 days  Active

 

 Cyclobenzaprine HCl 10 MG     TAKE ONE TABLET BY MOUTH TWICE DAILY           
30  Active

 

 Klonopin 0.5 MG  Orally Twice a day  1 tablet  12h        28 days  Active

 

 Cymbalta 60 mg  Orally Once a day  1 capsule  24h       90 days  
Active

 

 Hydrocodone-Acetaminophen 5-325 MG  Orally Once a day  1 tablet as needed  24h
  13 Dec, 2016     28 days  Active

 

 Arthrotec 75-0.2 MG  Orally Once a day  1 tablet  24h  25 Oct, 2016     90 
days  Active







RESULTS







 Name  Result  Date  Reference Range

 

 AMERITOX     2016   







PROCEDURES







 Procedure  Date Ordered  Related Diagnosis  Body Site

 

 CRYOTHERAPY OF SKIN  2016  N/A   

 

 CRYOTHERAPY OF SKIN  Dec 14, 2016      

 

 No Charge  Dec 14, 2016      

 

 Office Visit, Est Pt., Level 3  Dec 14, 2016      







IMMUNIZATIONS

No Known Immunizations
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
## 2018-03-22 ENCOUNTER — HOSPITAL ENCOUNTER (OUTPATIENT)
Dept: HOSPITAL 75 - RAD | Age: 58
End: 2018-03-22
Attending: NURSE PRACTITIONER
Payer: MEDICAID

## 2018-03-22 DIAGNOSIS — R10.32: Primary | ICD-10-CM

## 2018-03-22 PROCEDURE — 74018 RADEX ABDOMEN 1 VIEW: CPT

## 2018-03-22 NOTE — DIAGNOSTIC IMAGING REPORT
INDICATION: Acute left lower quadrant pain.



TIME OF EXAM: 9:42 AM



FINDINGS: Single view of the abdomen was obtained. The bowel gas

pattern is nonobstructed. No pathologic calcifications are

identified. No free air is identified on this single view. There

are degenerative changes in the lower lumbar spine.



IMPRESSION: No acute abnormality detected.



Called to Leticia at 10:35 by cvb.



Dictated by: 



  Dictated on workstation # [id number]

## 2018-04-16 ENCOUNTER — HOSPITAL ENCOUNTER (OUTPATIENT)
Dept: HOSPITAL 75 - RAD | Age: 58
End: 2018-04-16
Attending: NURSE PRACTITIONER
Payer: MEDICAID

## 2018-04-16 DIAGNOSIS — M25.512: ICD-10-CM

## 2018-04-16 DIAGNOSIS — M51.36: ICD-10-CM

## 2018-04-16 DIAGNOSIS — M48.04: ICD-10-CM

## 2018-04-16 DIAGNOSIS — M46.84: ICD-10-CM

## 2018-04-16 DIAGNOSIS — M51.27: ICD-10-CM

## 2018-04-16 DIAGNOSIS — Z98.890: ICD-10-CM

## 2018-04-16 DIAGNOSIS — M46.86: ICD-10-CM

## 2018-04-16 DIAGNOSIS — M51.24: ICD-10-CM

## 2018-04-16 DIAGNOSIS — M48.061: Primary | ICD-10-CM

## 2018-04-16 PROCEDURE — 73221 MRI JOINT UPR EXTREM W/O DYE: CPT

## 2018-04-16 PROCEDURE — 72148 MRI LUMBAR SPINE W/O DYE: CPT

## 2018-04-16 NOTE — DIAGNOSTIC IMAGING REPORT
PROCEDURE: MRI left upper extremity without contrast.



TECHNIQUE: Multiplanar, multisequence MR imaging of the left

shoulder was performed without contrast.



COMPARISON: None available.



INDICATION: Left shoulder pain. Prior rotator cuff repair.



FINDINGS:

Rotator cuff: There are surgical changes from superior cuff

repair which include numerous soft tissue foci of susceptibility

artifact. Additionally there appear to be three soft tissue

anchor tracts within the mid humeral head, and there is potential

that the central soft tissue anchor may have been pulled free

from its osseous anchor site. There is no recurrent

full-thickness rotator cuff tear. Heterogeneous signal within the

repaired posterior supraspinatus and anterior infraspinatus may

relate to prior surgery and debridement. No rotator cuff muscle

atrophy.



Glenoid labrum: By non-arthrogram imaging, the glenoid labrum

appears intact. No para-labral cyst.



Long head of biceps: Long head of biceps is normally positioned

within the bicipital groove. The intracapsular segment is intact.



Bones and cartilage: Humeral head is normal in morphology without

fracture or focal osseous lesion.  No glenohumeral

chondromalacia. Mild degenerative capsular hypertrophy of the

acromioclavicular joint.



Soft tissues: No glenohumeral joint effusion. No MRI findings to

suggest adhesive capsulitis. No fluid or inflammatory like signal

within the subacromial/subdeltoid space to indicate bursitis.



IMPRESSION: 

1. Status post repair of the superior rotator cuff without

recurrent full-thickness tear. One of the soft tissue anchors in

the humeral head may have pulled free from its osseous anchoring

site and be located within the repaired posterior supraspinatus.

CT of the shoulder without contrast could be performed to further

assess this potential abnormality.

2. Heterogeneous signal and thinning of the repaired

supraspinatus is likely postoperative in nature and associated

with prior debridement.

3. Long head of biceps remains intact.



Dictated by: 



  Dictated on workstation # [id number]

## 2018-04-16 NOTE — DIAGNOSTIC IMAGING REPORT
CLINICAL INDICATION: Patient has chronic low back pain and

history of previous low back surgery in 2004.



EXAMINATION: MRI of the lumbar spine performed without IV

contrast. Sequences include sagittal T2, sagittal T1, sagittal T2

fat-sat, and axial T2.



COMPARISON: None.



FINDINGS: There is no evidence of acute lumbar spine fracture or

dislocation. There is a moderate amount of Modic type I

degenerative signal changes involving the right L4-L5 endplates

and small amount involving the right anterior T11-T12 endplates.

Remainder of the lumbar vertebra have normal T1-T2 signal. There

is no significant paraspinal soft tissue abnormality. The distal

thoracic spinal cord, conus medullaris, and cauda equina are

unremarkable. The conus medullaris tip is seen at the upper L1

vertebral body level.



There are mild to moderately hypertrophic spurs seen throughout

the thoracolumbar spine. There is mild right curvature of the

lumbar spine.



T11-T12: There is a diffuse disc bulge with a superimposed left

paracentral disc protrusion/herniation which causes mild central

canal narrowing. There is no significant neural foramen

narrowing. There is mild right facet arthropathy.



T12-L1: There is no significant central spinal canal or neural

foramen narrowing.



L1-L2: There are small anterior spurs. There is no significant

central spinal canal or neural foramen narrowing.



L2-L3: There is mild diffuse disc bulge with superimposed small

disc protrusion/herniation extending into the left foraminal

region causing mild left neural foramen narrowing. There is no

significant central canal narrowing. There is mild facet

arthropathy. There is no significant right neural foramen

narrowing.



L3-L4: There is a diffuse disc bulge with mild facet arthropathy.

There is moderate left neural foramen narrowing and no

significant right neural foramen narrowing. There appears to be

left L4 laminotomy.



L4-L5: There is a diffuse disc bulge with moderate-to-severe loss

of intervertebral disc height with the right side affected the

most. There are hypertrophic far right lateral disc spurs. There

is severe right neural foramen narrowing due to disc spurs and

right facet arthropathy/hypertrophy. There is mild left facet

arthropathy. There is no significant central canal narrowing and

there is no significant left neural foramen narrowing.



L5-S1: There is a minimal sized posterior disc bulge. There is no

significant central canal or neural foramen narrowing. There is

minimal facet arthropathy.



IMPRESSION:

1: There is no acute lumbar spine fracture or dislocation.

Suspected L4 left laminotomy.



2: There is mild dextroscoliosis of the lumbar spinal with

multilevel thoracolumbar spine degenerative disc disease which is

described in detail above.



3: There is severe right L4-L5 neural foramen narrowing due to

disc spurs and facet arthropathy.



4: There is moderate left L3-L4 neural foramen narrowing due to

disc herniation and facet arthropathy.



5: There is an L2-L3 diffuse disc bulge which contributes to mild

left neural foramen narrowing.



Dictated by: 



  Dictated on workstation # [id number]

## 2018-07-22 ENCOUNTER — HOSPITAL ENCOUNTER (EMERGENCY)
Dept: HOSPITAL 75 - ER | Age: 58
Discharge: HOME | End: 2018-07-22
Payer: MEDICAID

## 2018-07-22 VITALS — SYSTOLIC BLOOD PRESSURE: 117 MMHG | DIASTOLIC BLOOD PRESSURE: 73 MMHG

## 2018-07-22 VITALS — BODY MASS INDEX: 24.91 KG/M2 | HEIGHT: 66 IN | WEIGHT: 155 LBS

## 2018-07-22 DIAGNOSIS — S30.0XXA: ICD-10-CM
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
## 2018-07-22 DIAGNOSIS — Z88.5: ICD-10-CM

## 2018-07-22 DIAGNOSIS — Z79.51: ICD-10-CM

## 2018-07-22 DIAGNOSIS — Z90.89: ICD-10-CM

## 2018-07-22 DIAGNOSIS — F17.210: ICD-10-CM

## 2018-07-22 DIAGNOSIS — S63.501A: Primary | ICD-10-CM

## 2018-07-22 PROCEDURE — 73110 X-RAY EXAM OF WRIST: CPT

## 2018-07-22 PROCEDURE — 73090 X-RAY EXAM OF FOREARM: CPT

## 2018-07-22 PROCEDURE — 72170 X-RAY EXAM OF PELVIS: CPT

## 2018-07-22 NOTE — ED UPPER EXTREMITY
General


Chief Complaint:  Upper Extremity


Stated Complaint:  FELL HURT RT ARM


Nursing Triage Note:  


TO ROOM WAS PULLING ON LOAD WITH ROPE FELL BACK LANDING ON R ARM C/O PAIN IN 


ARM.


Nursing Sepsis Screen:  No Definite Risk


Source:  patient


Exam Limitations:  no limitations





History of Present Illness


Date Seen by Provider:  Jul 22, 2018


Time Seen by Provider:  13:37


Initial Comments


56 yo female patient presents to the ED with c/o right forearm and wrist pain 

after falling onto an outstretched right hand.  Patient states she was pulling 

on a tight rope over a trailer when the rope broke and she fell backwards.  

Denies hitting her head, loss of consciousness, or headache.  Does complain of 

left hip pain.  Denies back or neck pain.  Denies headache.  Does not take 

blood thinners or aspirin.


Location Injury Occurred:  outside of home


Onset:  this morning


Pain/Injury Location:  right forearm, right wrist; left other (left hip/buttock)


Method of Injury:  fell


Modifying Factors:  Improves With Immobilization; Worse With Movement





Allergies and Home Medications


Allergies


Coded Allergies:  


     hydrocodone (Verified  Allergy, Unknown, 7/22/18)


     morphine (Unverified  Allergy, Unknown, 5/7/17)





Home Medications


Albuterol Sulfate 1 Puff Puff, 2 PUFF IH Q4H PRN for WHEEZING


   1 PUFF = 90 MCG 


   Prescribed by: SHREYAS ORTEGA on 4/16/17 2049


Albuterol Sulfate 2.5 Mg/3 Ml Vial.neb, 2.5 MG IH Q4H


   Prescribed by: MELANY WARNER on 5/7/17 1154


Budesonide 1 Mg/2 Ml Ampul.neb, 1 MG IH BID


   Prescribed by: MELANY WARNER on 5/7/17 1154


Clonazepam 0.5 Mg Tablet, 0.5 MG PO BID PRN for prn, (Reported)


Cyclobenzaprine HCl 10 Mg Tablet, 10 MG PO BID, (Reported)


D-Methorphan Hb/Prometh HCl 118 Ml Syrup, 1-2 TSP PO Q4H


   Prescribed by: MELANY WARNER on 5/7/17 1154


Diclofenac/Misoprostol 1 Tab Tab, Unknown Dose PO UD, (Reported)


Gabapentin 600 Mg Tablet, 600 MG PO TID PRN for prn, (Reported)


Ondansetron HCl 4 Mg Tab, 4 MG PO Q6H PRN for NAUSEA/VOMITING-1ST LINE


   Prescribed by: SHREYAS ORTEGA on 4/16/17 2049


Oxycodone HCl/Acetaminophen 1 Each Tablet, 1 EACH PO DAILY PRN for prn, (

Reported)





Patient Home Medication List


Home Medication List Reviewed:  Yes





Constitutional:  no symptoms reported


EENTM:  no symptoms reported


Respiratory:  no symptoms reported


Cardiovascular:  no symptoms reported


Gastrointestinal:  no symptoms reported


Genitourinary:  no symptoms reported


Musculoskeletal:  see HPI; No back pain; joint pain (right forearm, right wrist

, left hip pain), joint swelling (right wrist); No neck pain


Skin:  No change in color, No lumps


Psychiatric/Neurological:  Denies Headache, Denies Numbness, Denies Paresthesia

, Denies Seizure, Denies Tingling, Denies Weakness


All Other Systems Reviewed


Negative Unless Noted:  Yes (Negative excepted noted.)





Past Medical-Social-Family Hx


Past Med/Social Hx:  Reviewed Nursing Past Med/Soc Hx


Patient Social History


Alcohol Use:  Occasionally Uses


Recreational Drug Use:  Yes


Smoking Status:  Current Everyday Smoker


Type Used:  Cigarettes


Recent Foreign Travel:  No


Contact w/Someone Who Travel:  No


Recent Infectious Disease Expo:  No


Recent Hopitalizations:  No





Immunizations Up To Date


Tetanus Booster (TDap):  Unknown


PED Vaccines UTD:  Yes





Seasonal Allergies


Seasonal Allergies:  No





Past Medical History


Surgeries:  Yes (BACK, ROTATOR CUFF)


Appendectomy, Orthopedic


Respiratory:  No


Cardiac:  No


Neurological:  No


Reproductive Disorders:  No


Genitourinary:  No


Gastrointestinal:  No


Musculoskeletal:  Yes


Arthritis, Chronic Back Pain


Endocrine:  No


Cancer:  No


Psychosocial:  No


Integumentary:  No


Blood Disorders:  No





Family Medical History


Reviewed Nursing Family Hx


No Pertinent Family Hx





Physical Exam


Vital Signs





Vital Signs - First Documented








 7/22/18





 13:33


 


Temp 98.0


 


Pulse 89


 


Resp 18


 


B/P (MAP) 117/73 (88)


 


O2 Delivery Room Air





Capillary Refill : Less Than 3 Seconds


Height, Weight, BMI


Height: 5'6.00"


Weight: 155lbs. oz. [id number];  BMI


Method:Stated


General Appearance:  WD/WN, no apparent distress


HEENT:  PERRL/EOMI, pharynx normal, other (normocephalic, atraumatic)


Neck:  non-tender, full range of motion, supple, normal inspection


Cardiovascular:  normal peripheral pulses, regular rate, rhythm, no murmur


Respiratory:  chest non-tender, lungs clear, normal breath sounds, no 

respiratory distress, no accessory muscle use


Back:  normal inspection, no vertebral tenderness


Shoulder:  normal inspection, non-tender, no evidence of injury, normal ROM


Elbow/Forearm:  Right, bone tenderness (distal right forearm tenderness), 

ecchymosis (faint ecchymosis noted to the dorsal aspect of the right distal 

forearm), limited ROM, pain, soft tissue tenderness, swelling (distal right 

forearm swelling)


Wrist:  Yes bone tenderness (right wrist tenderness), Yes ecchymosis (faint 

ecchymosis noted to the dorsal aspect of the right wrist), Yes limited ROM, Yes 

pain, Yes soft tissue tenderness, Yes swelling


Hand:  normal ROM, Right, bone tenderness (proximal dorsal right hand), soft 

tissue tenderness, swelling (proximal dorsal right hand swelling)


Neurologic/Tendon:  normal sensation, normal motor functions, normal tendon 

functions, no evidence tendon injury


Neurologic/Psychiatric:  no motor/sensory deficits, alert, normal mood/affect, 

oriented x 3


Skin:  normal color, warm/dry, ecchymosis (see forearm and wrist exam as 

described above)


left hip shows ttp over the left buttock without deformity or swelling.





Progress/Results/Core Measures


Results/Orders


My Orders





Orders - JS FERNANDEZ


Forearm, Right, 2 Views (7/22/18 13:49)


Wrist, Right, 3 Views Or More (7/22/18 13:49)


Pelvis (7/22/18 13:49)


Oxycodone/Apap 5/325mg Tablet (Percocet (7/22/18 13:49)





Vital Signs/I&O











 7/22/18





 13:33


 


Temp 98.0


 


Pulse 89


 


Resp 18


 


B/P (MAP) 117/73 (88)


 


O2 Delivery Room Air














Blood Pressure Mean:  88











Diagnostic Imaging





   Diagonstic Imaging:  Xray


   Plain Films/CT/US/NM/MRI:  forearm


Comments


INDICATION: Injury, wrist pain. AP and lateral views were obtained. There are 

no prior studies available for comparison. There is no fracture, dislocation or 

acute bony abnormality evident. The radiocarpal and elbow joints are fairly well

-maintained. The soft tissues are unremarkable. IMPRESSION: There is no 

evidence for an acute bony abnormality. Dictated on workstation # 

[id number]


   Reviewed:  Reviewed by Me (radiology report reviewed by me)








   Diagonstic Imaging:  Xray


   Plain Films/CT/US/NM/MRI:  other (right wrist)


Comments


FINDINGS: There is no fracture, dislocation or acute bony abnormality evident. 

The radiocarpal joint is fairly well maintained. There is mild degenerative 

disease of the triscaphe joint. The soft tissues are unremarkable. IMPRESSION: 

There is no evidence for an acute bony abnormality. Dictated on workstation # 

[id number]


   Reviewed:  Reviewed by Me (radiology report reviewed by me)








   Diagonstic Imaging:  Xray


   Plain Films/CT/US/NM/MRI:  pelvis


Comments


Pelvis at 2:30. INDICATION: Injury. Single AP view of the pelvis is obtained. 

There is no fracture, dislocation or acute bony abnormality evident. There is 

mild degenerative disease involving both hip joints. The degenerative changes 

are similar to the prior exam of 03/22/2018. The rounded lucency overlying the 

left femoral head seen on the prior study is again evident and no different. 

This is of uncertain etiology but most likely a benign process. There is mild 

symmetrical sclerosis of the sacroiliac joints. There does appear to be fairly 

severe degenerative disc and bony disease on the right at L4-L5. This finding 

is no different than on the prior exam, however. Soft tissues are unremarkable. 

IMPRESSION: There is no evidence for an acute bony abnormality. Dictated on 

workstation # [id number]


   Reviewed:  Reviewed by Me (radiology report reviewed by me)





Departure


Communication (Admissions)


Diagnostic findings discussed with the patient.  Patient placed in a right 

thumb spica brace and arm sling.  Plan for discharge to home.





Impression





 Primary Impression:  


 Sprain of wrist, right


 Qualified Codes:  S63.501A - Unspecified sprain of right wrist, initial 

encounter


 Additional Impression:  


 Contusion of buttock


 Qualified Codes:  S30.0XXA - Contusion of lower back and pelvis, initial 

encounter


Disposition:  01 HOME, SELF-CARE


Condition:  Improved





Departure-Patient Inst.


Decision time for Depature:  14:54


Referrals:  


YLUIA CHUNG MD (PCP/Family)


Primary Care Physician








MICHELLE WHITEHEAD MD


Patient Instructions:  How to Use a Shoulder Sling, Common Wrist Injuries (DC)





Add. Discharge Instructions:  


All discharge instructions reviewed with patient and/or family. Voiced 

understanding.  Tylenol extra strength over-the-counter as directed for pain.  

Ibuprofen 600 mg by mouth every 6-8 hours as needed for pain.  Elevate the 

right arm on pillows.  Ice pack for 20 minute intervals as needed for pain.  

Wrist brace and sling as instructed.  Left hand activities only x5-7 days, then 

increase activity as tolerated.  Follow-up with your primary care provider for 

recheck as an outpatient if no improvement in symptoms in 7-10 days.  At that 

time your provider may order a repeat x-ray or MRI.  Return to the emergency 

department for worsened symptoms or any other concerns.











JS FERNANDEZ Jul 22, 2018 13:56

## 2018-07-22 NOTE — DIAGNOSTIC IMAGING REPORT
Right forearm at 02/26.



INDICATION: Injury, wrist pain.



AP and lateral views were obtained. There are no prior studies

available for comparison.



There is no fracture, dislocation or acute bony abnormality

evident. The radiocarpal and elbow joints are fairly

well-maintained. The soft tissues are unremarkable.



IMPRESSION: There is no evidence for an acute bony abnormality. 



Dictated by: 



  Dictated on workstation # [id number]

## 2018-07-22 NOTE — DIAGNOSTIC IMAGING REPORT
INDICATION: Injury, wrist pain. 



EXAMINATION: Right wrist at 2:27 p.m. Three views were obtained.



FINDINGS: There is no fracture, dislocation or acute bony

abnormality evident. The radiocarpal joint is fairly well

maintained. There is mild degenerative disease of the triscaphe

joint. The soft tissues are unremarkable. 



IMPRESSION: There is no evidence for an acute bony abnormality. 



Dictated by: 



  Dictated on workstation # [id number]

## 2019-12-06 NOTE — DIAGNOSTIC IMAGING REPORT
Pelvis at 2:30.



INDICATION: Injury.



Single AP view of the pelvis is obtained. There is no fracture,

dislocation or acute bony abnormality evident. There is mild

degenerative disease involving both hip joints. The degenerative

changes are similar to the prior exam of 03/22/2018. The rounded

lucency overlying the left femoral head seen on the prior study

is again evident and no different. This is of uncertain etiology

but most likely a benign process.



There is mild symmetrical sclerosis of the sacroiliac joints.

There does appear to be fairly severe degenerative disc and bony

disease on the right at L4-L5. This finding is no different than

on the prior exam, however. 



The soft tissues are unremarkable.



IMPRESSION: There is no evidence for an acute bony abnormality.



Dictated by: 



  Dictated on workstation # [id number] [de-[id number]] : Patient is here today for difficulty hearing and loss of taste issues. Pt has decreased taste and smell for years. Pt has tried many home remedies with no improvement. Pt has ability to breath through her nose and she uses menthol on occasion. Pt has no other nasal symptoms. Pt has constant throat clearing. \par \par Pt has longtime hearing loss that is gradual. She needs people to repeat themselves when speaking to her sometimes.

## 2020-11-05 ENCOUNTER — HOSPITAL ENCOUNTER (OUTPATIENT)
Dept: HOSPITAL 75 - PREOP | Age: 60
Discharge: HOME | End: 2020-11-05
Attending: SPECIALIST
Payer: MEDICAID

## 2020-11-05 VITALS — HEIGHT: 65.75 IN | WEIGHT: 141.1 LBS | BODY MASS INDEX: 22.95 KG/M2

## 2020-11-05 DIAGNOSIS — Z01.812: Primary | ICD-10-CM

## 2020-11-05 DIAGNOSIS — Z20.828: ICD-10-CM

## 2020-11-05 PROCEDURE — 87635 SARS-COV-2 COVID-19 AMP PRB: CPT

## 2020-11-06 ENCOUNTER — HOSPITAL ENCOUNTER (OUTPATIENT)
Dept: HOSPITAL 75 - SDC | Age: 60
Discharge: HOME | End: 2020-11-06
Attending: SPECIALIST
Payer: MEDICAID

## 2020-11-06 VITALS — HEIGHT: 65.75 IN | WEIGHT: 141.1 LBS | BODY MASS INDEX: 22.95 KG/M2

## 2020-11-06 VITALS — SYSTOLIC BLOOD PRESSURE: 125 MMHG | DIASTOLIC BLOOD PRESSURE: 67 MMHG

## 2020-11-06 VITALS — DIASTOLIC BLOOD PRESSURE: 68 MMHG | SYSTOLIC BLOOD PRESSURE: 94 MMHG

## 2020-11-06 DIAGNOSIS — G47.00: ICD-10-CM

## 2020-11-06 DIAGNOSIS — M06.9: ICD-10-CM

## 2020-11-06 DIAGNOSIS — F41.9: ICD-10-CM

## 2020-11-06 DIAGNOSIS — Z88.5: ICD-10-CM

## 2020-11-06 DIAGNOSIS — H25.12: Primary | ICD-10-CM

## 2020-11-06 DIAGNOSIS — Z79.899: ICD-10-CM

## 2020-11-06 DIAGNOSIS — F32.9: ICD-10-CM

## 2020-11-06 DIAGNOSIS — F17.210: ICD-10-CM

## 2020-11-06 PROCEDURE — 66984 XCAPSL CTRC RMVL W/O ECP: CPT

## 2020-11-06 RX ADMIN — TROPICAMIDE SCH ML: 10 SOLUTION/ DROPS OPHTHALMIC at 08:10

## 2020-11-06 RX ADMIN — TROPICAMIDE SCH ML: 10 SOLUTION/ DROPS OPHTHALMIC at 08:21

## 2020-11-06 RX ADMIN — TETRACAINE HYDROCHLORIDE PRN ML: 5 SOLUTION OPHTHALMIC at 08:04

## 2020-11-06 RX ADMIN — PHENYLEPHRINE HYDROCHLORIDE SCH ML: 100 SOLUTION/ DROPS OPHTHALMIC at 08:21

## 2020-11-06 RX ADMIN — TETRACAINE HYDROCHLORIDE PRN ML: 5 SOLUTION OPHTHALMIC at 07:51

## 2020-11-06 RX ADMIN — TROPICAMIDE SCH ML: 10 SOLUTION/ DROPS OPHTHALMIC at 08:04

## 2020-11-06 RX ADMIN — PHENYLEPHRINE HYDROCHLORIDE SCH ML: 100 SOLUTION/ DROPS OPHTHALMIC at 08:04

## 2020-11-06 RX ADMIN — PHENYLEPHRINE HYDROCHLORIDE SCH ML: 100 SOLUTION/ DROPS OPHTHALMIC at 08:10

## 2020-11-06 RX ADMIN — TETRACAINE HYDROCHLORIDE PRN ML: 5 SOLUTION OPHTHALMIC at 08:10

## 2020-11-06 RX ADMIN — TETRACAINE HYDROCHLORIDE PRN ML: 5 SOLUTION OPHTHALMIC at 08:21

## 2020-11-06 NOTE — ANESTHESIA-GENERAL POST-OP
MAC


Patient Condition


Mental Status/LOC:  Same as Preop


Cardiovascular:  Satisfactory


Nausea/Vomiting:  Absent


Respiratory:  Satisfactory


Pain:  Controlled


Complications:  Absent





Post Op Complications


Complications


None





Follow Up Care/Instructions


Patient Instructions


None needed.





Anesthesiology Discharge Order


Discharge Order


Patient is doing well, no complaints, stable vital signs, no apparent adverse 

anesthesia problems.   


No complications reported per nursing.











BHARTI AMRQUES CRNA            Nov 6, 2020 12:20

## 2020-11-06 NOTE — OPHTHALMOLOGY OPERATIVE REPORT
Cataract removal/placement IOL


PREOPERATIVE DIAGNOSIS:      1. Mature Cataract Left Eye


         2. Stain the anterior capsule with Vision Blue.   


POSTOPERATIVE DIAGNOSIS:    1. Mature Cataract Left Eye


         2. Stain the anterior capsule with Vision Blue.   





PROCEDURE:       1. Cataract removal and placement of posterior chamber implant,

left eye.


                                 2. Stain the anterior capsule with Vision Blue.





SURGEON: Ari Connolly 





ANESTHESIA: Topical with sedation





COMPLICATIONS: None





ESTIMATED BLOOD LOSS: Minimal 





DESCRIPTION OF PROCEDURE:


After proper informed consent was obtained, the patient, 60 female ,was taken to

the Operating Room and the left eye was anesthetized with tetracaine.  The left 

eye was then prepped and draped in the usual manner.  A wire lid speculum was 

placed.  A paracentesis was made at the left hand position.  Preservative free 

lidocaine was injected into anterior chamber followed by viscoelastic.  A clear 

corneal incision was made in the temporal position.  A capsulorrhexis was 

performed and the central nuclear and cortical material were removed.  Vision 

blue was used to visualize capsule.  The posterior capsule was polished and 

Ojhn AUOOTO 21.0 IOL was placed into the capsular bag.  The residual v

iscoelastic was aspirated and balanced saline solution was injected into the 

anterior chamber.  Moxifloxacin was injected into the anterior chamber.  





The wound was checked and found to be water tight.





The patient tolerated the procedure well without complications.











ARI CONNOLLY MD              Nov 6, 2020 09:04

## 2020-11-06 NOTE — OPHTHALMOLOGIST PRE-OP NOTE
Pre-Operative Progress Note


H&P Reviewed


The H&P was reviewed, patient examined and no changes noted.


Date H&P Reviewed:  Nov 6, 2020


Time H&P Reviewed:  08:37


Pre-Op Dx


Cataract, Left Eye











ALFREDITO CONNOLLY MD              Nov 6, 2020 08:37

## 2020-11-10 ENCOUNTER — HOSPITAL ENCOUNTER (OUTPATIENT)
Dept: HOSPITAL 75 - PREOP | Age: 60
Discharge: HOME | End: 2020-11-10
Attending: SPECIALIST
Payer: MEDICAID

## 2020-11-10 DIAGNOSIS — U07.1: ICD-10-CM

## 2020-11-10 DIAGNOSIS — Z01.812: Primary | ICD-10-CM

## 2020-11-10 PROCEDURE — 87635 SARS-COV-2 COVID-19 AMP PRB: CPT

## 2020-12-12 ENCOUNTER — HOSPITAL ENCOUNTER (EMERGENCY)
Dept: HOSPITAL 75 - ER | Age: 60
Discharge: HOME | End: 2020-12-12
Payer: MEDICAID

## 2020-12-12 VITALS — WEIGHT: 141.1 LBS | HEIGHT: 66.02 IN | BODY MASS INDEX: 22.68 KG/M2

## 2020-12-12 VITALS — SYSTOLIC BLOOD PRESSURE: 119 MMHG | DIASTOLIC BLOOD PRESSURE: 81 MMHG

## 2020-12-12 DIAGNOSIS — Z88.5: ICD-10-CM

## 2020-12-12 DIAGNOSIS — R07.9: ICD-10-CM

## 2020-12-12 DIAGNOSIS — F41.9: Primary | ICD-10-CM

## 2020-12-12 LAB
ALBUMIN SERPL-MCNC: 4.1 GM/DL (ref 3.2–4.5)
ALP SERPL-CCNC: 128 U/L (ref 40–136)
ALT SERPL-CCNC: 12 U/L (ref 0–55)
APTT BLD: 29 SEC (ref 24–35)
BASOPHILS # BLD AUTO: 0.1 10^3/UL (ref 0–0.1)
BASOPHILS NFR BLD AUTO: 1 % (ref 0–10)
BILIRUB SERPL-MCNC: 0.4 MG/DL (ref 0.1–1)
BUN/CREAT SERPL: 14
CALCIUM SERPL-MCNC: 9.4 MG/DL (ref 8.5–10.1)
CHLORIDE SERPL-SCNC: 106 MMOL/L (ref 98–107)
CO2 SERPL-SCNC: 18 MMOL/L (ref 21–32)
CREAT SERPL-MCNC: 0.84 MG/DL (ref 0.6–1.3)
EOSINOPHIL # BLD AUTO: 0.2 10^3/UL (ref 0–0.3)
EOSINOPHIL NFR BLD AUTO: 1 % (ref 0–10)
GFR SERPLBLD BASED ON 1.73 SQ M-ARVRAT: > 60 ML/MIN
GLUCOSE SERPL-MCNC: 107 MG/DL (ref 70–105)
HCT VFR BLD CALC: 46 % (ref 35–52)
HGB BLD-MCNC: 15.2 G/DL (ref 11.5–16)
INR PPP: 0.9 (ref 0.8–1.4)
LYMPHOCYTES # BLD AUTO: 5.8 10^3/UL (ref 1–4)
LYMPHOCYTES NFR BLD AUTO: 42 % (ref 12–44)
MAGNESIUM SERPL-MCNC: 1.8 MG/DL (ref 1.6–2.4)
MANUAL DIFFERENTIAL PERFORMED BLD QL: NO
MCH RBC QN AUTO: 31 PG (ref 25–34)
MCHC RBC AUTO-ENTMCNC: 33 G/DL (ref 32–36)
MCV RBC AUTO: 93 FL (ref 80–99)
MONOCYTES # BLD AUTO: 1.1 10^3/UL (ref 0–1)
MONOCYTES NFR BLD AUTO: 8 % (ref 0–12)
NEUTROPHILS # BLD AUTO: 6.6 10^3/UL (ref 1.8–7.8)
NEUTROPHILS NFR BLD AUTO: 48 % (ref 42–75)
PLATELET # BLD: 276 10^3/UL (ref 130–400)
PMV BLD AUTO: 10.5 FL (ref 9–12.2)
POTASSIUM SERPL-SCNC: 4 MMOL/L (ref 3.6–5)
PROT SERPL-MCNC: 7.7 GM/DL (ref 6.4–8.2)
PROTHROMBIN TIME: 12.8 SEC (ref 12.2–14.7)
SODIUM SERPL-SCNC: 135 MMOL/L (ref 135–145)
WBC # BLD AUTO: 13.8 10^3/UL (ref 4.3–11)

## 2020-12-12 PROCEDURE — 93005 ELECTROCARDIOGRAM TRACING: CPT

## 2020-12-12 PROCEDURE — 83880 ASSAY OF NATRIURETIC PEPTIDE: CPT

## 2020-12-12 PROCEDURE — 85730 THROMBOPLASTIN TIME PARTIAL: CPT

## 2020-12-12 PROCEDURE — 83690 ASSAY OF LIPASE: CPT

## 2020-12-12 PROCEDURE — 83735 ASSAY OF MAGNESIUM: CPT

## 2020-12-12 PROCEDURE — 85610 PROTHROMBIN TIME: CPT

## 2020-12-12 PROCEDURE — 84484 ASSAY OF TROPONIN QUANT: CPT

## 2020-12-12 PROCEDURE — 80053 COMPREHEN METABOLIC PANEL: CPT

## 2020-12-12 PROCEDURE — 83874 ASSAY OF MYOGLOBIN: CPT

## 2020-12-12 PROCEDURE — 71045 X-RAY EXAM CHEST 1 VIEW: CPT

## 2020-12-12 PROCEDURE — 85025 COMPLETE CBC W/AUTO DIFF WBC: CPT

## 2020-12-12 PROCEDURE — 71250 CT THORAX DX C-: CPT

## 2020-12-12 PROCEDURE — 93041 RHYTHM ECG TRACING: CPT

## 2020-12-12 PROCEDURE — 36415 COLL VENOUS BLD VENIPUNCTURE: CPT

## 2020-12-12 NOTE — ED CHEST PAIN
General


Chief Complaint:  Chest Pain


Stated Complaint:  CP


Nursing Triage Note:  


PT TO ROOM 06 VIA W/C WITH C/O CHEST PAIN STARTING AT 1900 YESTERDAY. PT WAS 


SEEN AT [facility] TODAY AND SENT TO ED.


Nursing Sepsis Screen:  No Definite Risk


Source:  patient


Exam Limitations:  no limitations





History of Present Illness


Date Seen by Provider:  Dec 12, 2020


Time Seen by Provider:  13:46


Initial Comments


To ER with reports of left lower chest pain sharp in nature since 7 PM last 

night.  She does not have fevers chills or shortness of breath.  She does not 

have a cough.  The pain is sharp in nature.  Its been constant since last night.

 She has no history of this nor does she have a history of coronary stenting.  

She called EMS last night and was checked out but then ultimately refused 

transport.  She was formerly on Klonopin for anxiety but states that [facility] stopped these 3 days ago.


Timing/Duration:  changing over time


Severity/Quality:  moderate


Location:  central


Radiation:  no radiation


Activities at Onset:  none


ASA po PTA:  No


NTG SL PTA:  No


Associated Symptoms:  No nausea/vomiting, No shortness of breath





Allergies and Home Medications


Allergies


Coded Allergies:  


     hydrocodone (Verified  Allergy, Mild, ITCHING, 11/3/20)


     morphine (Unverified  Allergy, Mild, N/V, 11/3/20)





Home Medications


Duloxetine HCl 60 Mg Capsule.dr, 60 MG PO DAILY, (Reported)


Gabapentin 600 Mg Tablet, 600 MG PO TID PRN for prn, (Reported)


Pantoprazole Sodium 20 Mg Tablet.dr, 20 MG PO DAILY, (Reported)


Trazodone HCl 50 Mg Tablet, 50 MG PO PRN, (Reported)





Patient Home Medication List


Home Medication List Reviewed:  Yes





Review of Systems


Review of Systems


Constitutional:  see HPI; No chills, No fever


EENTM:  No Symptoms Reported


Respiratory:  No Symptoms Reported


Cardiovascular:  See HPI, Chest Pain


Gastrointestinal:  See HPI


Genitourinary:  No Symptoms Reported


Musculoskeletal:  no symptoms reported


Skin:  no symptoms reported


Psychiatric/Neurological:  No Symptoms Reported


Endocrine:  No Symptoms Reported


Hematologic/Lymphatic:  No Symptoms Reported





Past Medical-Social-Family Hx


Patient Social History


Type Used:  Cigarettes


Recent Foreign Travel:  No


Contact w/Someone Who Travel:  No


Recent Infectious Disease Expo:  No


Recent Hopitalizations:  No





Immunizations Up To Date


Tetanus Booster (TDap):  Unknown


PED Vaccines UTD:  Yes





Seasonal Allergies


Seasonal Allergies:  No





Past Medical History


Surgeries:  Yes (BACK, ROTATOR CUFF)


Appendectomy, Orthopedic


Respiratory:  No


Cardiac:  No


Neurological:  No


Reproductive Disorders:  No


Genitourinary:  No


Gastrointestinal:  No


Musculoskeletal:  Yes


Arthritis, Chronic Back Pain


Endocrine:  No


Cancer:  No


Psychosocial:  No


Integumentary:  No


Blood Disorders:  No





Family Medical History


No Pertinent Family Hx





Physical Exam


Vital Signs





Vital Signs - First Documented








 20





 13:38


 


Temp 36.3


 


Pulse 70


 


Resp 19


 


B/P (MAP) 128/91 (103)


 


O2 Delivery Room Air





Capillary Refill : Less Than 3 Seconds


Height, Weight, BMI


Height: 5'6.00"


Weight: 155lbs. oz. 70.108361um; 22.00 BMI


Method:Stated


General Appearance:  No Apparent Distress, WD/WN, Anxious (Tachypneic, anxious 

appearing, shaky.), Other (SpO2 100% on room air. )


Neck:  Full Range of Motion, Normal Inspection


Respiratory:  No Accessory Muscle Use, No Respiratory Distress


Cardiovascular:  Regular Rate, Rhythm, Normal Peripheral Pulses


Gastrointestinal:  Normal Bowel Sounds, Non Tender, Soft


Extremity:  Normal Capillary Refill, Normal Inspection


Neurologic/Psychiatric:  Alert, Oriented x3


Skin:  Normal Color, Warm/Dry





Progress/Results/Core Measures


Results/Orders


Lab Results





Laboratory Tests








Test


 20


13:37 Range/Units


 


 


White Blood Count


 13.8 H


 4.3-11.0


10^3/uL


 


Red Blood Count


 4.88 


 3.80-5.11


10^6/uL


 


Hemoglobin 15.2  11.5-16.0  g/dL


 


Hematocrit 46  35-52  %


 


Mean Corpuscular Volume 93  80-99  fL


 


Mean Corpuscular Hemoglobin 31  25-34  pg


 


Mean Corpuscular Hemoglobin


Concent 33 


 32-36  g/dL





 


Red Cell Distribution Width 13.5  10.0-14.5  %


 


Platelet Count


 276 


 130-400


10^3/uL


 


Mean Platelet Volume 10.5  9.0-12.2  fL


 


Immature Granulocyte % (Auto) 0   %


 


Neutrophils (%) (Auto) 48  42-75  %


 


Lymphocytes (%) (Auto) 42  12-44  %


 


Monocytes (%) (Auto) 8  0-12  %


 


Eosinophils (%) (Auto) 1  0-10  %


 


Basophils (%) (Auto) 1  0-10  %


 


Neutrophils # (Auto)


 6.6 


 1.8-7.8


10^3/uL


 


Lymphocytes # (Auto)


 5.8 H


 1.0-4.0


10^3/uL


 


Monocytes # (Auto)


 1.1 H


 0.0-1.0


10^3/uL


 


Eosinophils # (Auto)


 0.2 


 0.0-0.3


10^3/uL


 


Basophils # (Auto)


 0.1 


 0.0-0.1


10^3/uL


 


Immature Granulocyte # (Auto)


 0.0 


 0.0-0.1


10^3/uL


 


Prothrombin Time 12.8  12.2-14.7  SEC


 


INR Comment 0.9  0.8-1.4  


 


Activated Partial


Thromboplast Time 29 


 24-35  SEC





 


Sodium Level 135  135-145  MMOL/L


 


Potassium Level 4.0  3.6-5.0  MMOL/L


 


Chloride Level 106    MMOL/L


 


Carbon Dioxide Level 18 L 21-32  MMOL/L


 


Anion Gap 11  5-14  MMOL/L


 


Blood Urea Nitrogen 12  7-18  MG/DL


 


Creatinine


 0.84 


 0.60-1.30


MG/DL


 


Estimat Glomerular Filtration


Rate > 60 


  





 


BUN/Creatinine Ratio 14   


 


Glucose Level 107 H   MG/DL


 


Calcium Level 9.4  8.5-10.1  MG/DL


 


Corrected Calcium 9.3  8.5-10.1  MG/DL


 


Magnesium Level 1.8  1.6-2.4  MG/DL


 


Total Bilirubin 0.4  0.1-1.0  MG/DL


 


Aspartate Amino Transf


(AST/SGOT) 19 


 5-34  U/L





 


Alanine Aminotransferase


(ALT/SGPT) 12 


 0-55  U/L





 


Alkaline Phosphatase 128    U/L


 


Myoglobin


 41.5 


 10.0-92.0


NG/ML


 


Troponin I < 0.028  <0.028  NG/ML


 


B-Type Natriuretic Peptide 20.7  <100.0  PG/ML


 


Total Protein 7.7  6.4-8.2  GM/DL


 


Albumin 4.1  3.2-4.5  GM/DL


 


Lipase 28  8-78  U/L








My Orders





Orders - RACQUEL MUÑIZ APRN


Cbc With Automated Diff (20 13:43)


Magnesium (20 13:43)


Chest 1 View, Ap/Pa Only (20 13:43)


Ekg Tracing (20 13:43)


Comprehensive Metabolic Panel (20 13:43)


Myoglobin Serum (20 13:43)


Protime With Inr (20 13:43)


Partial Thromboplastin Time (20 13:43)


O2 (20 13:43)


Monitor-Rhythm Ecg Trace Only (20 13:43)


Lipid Panel (20 06:00)


Ed Iv/Invasive Line Start (20 13:43)


BNP (20 13:43)


Aspirin Chewable Tablet (Baby Aspirin Ch (20 13:45)


Lorazepam Injection (Ativan Injection) (20 13:45)


Ketorolac Injection (Toradol Injection) (20 13:45)


Troponin I (20 13:43)


Antacid  Suspension (Mylanta  Suspension (20 14:30)


Lidocaine 2% Viscous 15 Ml (Xylocaine Vi (20 14:30)


Lipase (20 14:30)


Ct Chest Wo (20 15:00)





Medications Given in ED





Current Medications








 Medications  Dose


 Ordered  Sig/Kaden


 Route  Start Time


 Stop Time Status Last Admin


Dose Admin


 


 Al Hydrox/Mg


 Hydrox/Simethicone  30 ml  ONCE  ONCE


 PO  20 14:30


 20 14:31 DC 20 14:39


30 ML


 


 Aspirin  324 mg  ONCE  ONCE


 PO  20 13:45


 20 13:46 DC 20 14:06


324 MG


 


 Ketorolac


 Tromethamine  15 mg  ONCE  ONCE


 IVP  20 13:45


 20 13:46 DC 20 14:06


15 MG


 


 Lidocaine HCl  10 ml  ONCE  ONCE


 PO  20 14:30


 20 14:31 DC 20 14:39


10 ML


 


 Lorazepam  1 mg  ONCE  ONCE


 IVP  20 13:45


 20 13:46 DC 20 14:06


1 MG








Vital Signs/I&O











 20





 13:38 13:42


 


Temp 36.3 


 


Pulse 70 


 


Resp 19 


 


B/P (MAP) 128/91 (103) 


 


O2 Delivery Room Air Room Air














Blood Pressure Mean:                    103











Departure


Communication (Admissions)


Family Conversation


NAME:   KAITUSKA TRIANA


[facility] REC#:   [id number]


ACCOUNT#:   [id number]


PT STATUS:   REG ER


:   1960


PHYSICIAN:   RACQUEL MUÑIZ


ADMIT DATE:   20/ER


                                   ***Draft***


Date of Exam:20





CT CHEST WO








PROCEDURE: CT chest without contrast.





TECHNIQUE: Multiple contiguous axial images were obtained through


the chest without the use of intravenous contrast. Auto Exposure


Controls were utilized during the CT exam to meet ALARA standards


for radiation dose reduction. 





INDICATION: Chest pain, epigastric pain starting one day ago. 





CORRELATION: Chest radiograph 2020.





FINDINGS: There is a very small area of extraluminal gas located


at the level of the thoracic inlet posterior to the right aspect


of the trachea and to the right aspect of the esophagus. No


definitive disruption of the trachea and/or esophagus is noted.


Otherwise, there is no suggestion for abnormal pneumomediastinum.


Heart size is within normal limits with scattered mild coronary


artery calcification. There is prominence of the ascending aorta


at 3.7 cm. No suggestion for pathologically enlarged mediastinal


lymph nodes on noncontrast imaging. 





Slight asymmetric emphysematous change about the lung apices. No


consolidating infiltrate. No significant pleural effusion.  





The visualized portions of the upper abdomen are unremarkable.





Mildly advanced degenerative changes throughout the thoracic


spine with slightly accentuated thoracic kyphosis.





IMPRESSION: 


1. Very small area of extraluminal gas at the thoracic inlet


adjacent to the trachea and proximal esophagus. No definitive


disruption of the subjacent structures, additional inflammatory


changes or findings to suggest additional evidence for


pneumomediastinum. This may very well reflect small diverticulum


or laryngocele.


2. Otherwise, relatively unremarkable noncontrast CT examination


of the chest.





  Dictated on workstation # [id number]








Dict:   20 1600


Trans:   20 1637


[facility] [id number]





Interpreted by:     HEATHER ANN DO


Electronically signed by:


I discussed the findings of extraluminal gas with Dr. Crawford.  States Augmentin 

antibiotics would be optional, clear liquids for 24 hours.





Impression





   Primary Impression:  


   Anxiety


   Additional Impression:  


   Chest pain


Disposition:  01 HOME, SELF-CARE


Condition:  Stable





Departure-Patient Inst.


Decision time for Depature:  14:45


Referrals:  


[facility] [place])


Primary Care Physician








FADY PANDA MD


Patient Instructions:  Chest Pain





Add. Discharge Instructions:  


1. Return to Er for any concerns. Follow up with your doctor next week.  There 

is a small area of gas that appears to be outside of your trachea, this could 

represent a a pocket of the esophagus called a diverticulum or this could 

represent a small tear in the esophagus but your location of pain and other 

symptoms and labs do not support that.  We will play it safe and put you on some

antibiotics for 3 days and have you use only clear liquids for the next 24 

hours.  Call Dr. PANDA on Monday to make an appointment to be seen for follow-up.

 Return to ER for any worsening.


All discharge instructions reviewed with patient and/or family. Voiced 

understanding.


Scripts


Amoxicillin/Potassium Clav (Augmentin 875-125 Tablet) 1 Each Tablet


1 EACH PO BID, #10 TAB 0 Refills


   Prov: RACQUEL MUÑIZ         20











RACQUEL MUÑIZ             Dec 12, 2020 13:48

## 2020-12-12 NOTE — DIAGNOSTIC IMAGING REPORT
PROCEDURE: CT chest without contrast.



TECHNIQUE: Multiple contiguous axial images were obtained through

the chest without the use of intravenous contrast. Auto Exposure

Controls were utilized during the CT exam to meet ALARA standards

for radiation dose reduction. 



INDICATION: Chest pain, epigastric pain starting one day ago. 



CORRELATION: Chest radiograph 12/12/2020.



FINDINGS: There is a very small area of extraluminal gas located

at the level of the thoracic inlet posterior to the right aspect

of the trachea and to the right aspect of the esophagus. No

definitive disruption of the trachea and/or esophagus is noted.

Otherwise, there is no suggestion for abnormal pneumomediastinum.

Heart size is within normal limits with scattered mild coronary

artery calcification. There is prominence of the ascending aorta

at 3.7 cm. No suggestion for pathologically enlarged mediastinal

lymph nodes on noncontrast imaging. 



Slight asymmetric emphysematous change about the lung apices. No

consolidating infiltrate. No significant pleural effusion.  



The visualized portions of the upper abdomen are unremarkable.



Mildly advanced degenerative changes throughout the thoracic

spine with slightly accentuated thoracic kyphosis.



IMPRESSION: 

1. Very small area of extraluminal gas at the thoracic inlet

adjacent to the trachea and proximal esophagus. No definitive

disruption of the subjacent structures, additional inflammatory

changes or findings to suggest additional evidence for

pneumomediastinum. This may very well reflect small diverticulum

or laryngocele.

2. Otherwise, relatively unremarkable noncontrast CT examination

of the chest.



Dictated by: 



  Dictated on workstation # [id number]

## 2020-12-12 NOTE — DIAGNOSTIC IMAGING REPORT
INDICATION:  Chest pain.  



TECHNIQUE:  Single view chest 2:09 PM.



CORRELATION STUDY:  05/07/2017



FINDINGS: 

The heart size, mediastinal configuration and pulmonary

vascularity are within normal limits.  

The lungs are clear with no consolidating infiltrate. There is no

significant effusion or pneumothorax. 

Degenerative osteophytes of the thoracic spine.



IMPRESSION: 

1. Negative for acute abnormality of the chest.



Dictated by: 



  Dictated on workstation # [id number]

## 2021-01-19 ENCOUNTER — HOSPITAL ENCOUNTER (OUTPATIENT)
Dept: HOSPITAL 75 - CARD | Age: 61
End: 2021-01-19
Attending: INTERNAL MEDICINE
Payer: MEDICAID

## 2021-01-19 DIAGNOSIS — I07.1: Primary | ICD-10-CM

## 2021-01-19 PROCEDURE — 93306 TTE W/DOPPLER COMPLETE: CPT

## 2021-01-25 ENCOUNTER — HOSPITAL ENCOUNTER (OUTPATIENT)
Dept: HOSPITAL 75 - CARD | Age: 61
End: 2021-01-25
Attending: INTERNAL MEDICINE
Payer: MEDICAID

## 2021-01-25 VITALS — SYSTOLIC BLOOD PRESSURE: 154 MMHG | DIASTOLIC BLOOD PRESSURE: 91 MMHG

## 2021-01-25 DIAGNOSIS — R00.2: Primary | ICD-10-CM

## 2021-01-25 PROCEDURE — 93017 CV STRESS TEST TRACING ONLY: CPT

## 2021-01-25 PROCEDURE — 78452 HT MUSCLE IMAGE SPECT MULT: CPT

## 2021-01-25 RX ADMIN — Medication PRN ML: at 08:16

## 2021-01-25 RX ADMIN — Medication PRN ML: at 09:28

## 2021-01-25 NOTE — CARDIOLOGY STRESS TEST REPORT
Stress Test Report


Date of Procedure/Referring:


Date of Procedure:  Jan 25, 2021


PCP


Quinten Herbert MD


Admitting Physician


Center/[facility]





Indications:


Palpitation





Baseline Heart Rate:


62





Baseline Blood Pressure:


Blood Pressure Systolic:  154


Blood Pressure Diastolic:  91


Baseline Vitals





Vital Signs








  Date Time  Temp Pulse Resp B/P (MAP) Pulse Ox O2 Delivery O2 Flow Rate FiO2


 


1/25/21 09:22  62  154/91 (112) 98 Room Air  











Baseline EKG:


Baseline EKG:  NSR





Summary


After explaining the procedure to the patient, she  signed a consent and then 

brought to the stress nuclear laboratory.


Patient received 0.4 mg Lexiscan for stress test, ECG, heart rate and blood 

pressure were monitored continuously.  Resting and stress dose of radio tracer 

were injected, imaging was acquired and reviewed in short axis, horizontal long 

axis and vertical long axis views.


TID:  1.05


SSS:  1


SDS:  1


EF:  68


1.  Patient had some chest pain after Lexiscan injection, feeling better now


2.  Minimal nondiagnostic EKG changes with Lexiscan injection resolved after 

recovery


3.  Breast attenuation with no significant ischemia or infarction on SPECT


4.  Normal left ventricular size, EF 68 percent











QUINTEN HERBERT MD              Jan 25, 2021 10:37

## 2021-02-03 ENCOUNTER — HOSPITAL ENCOUNTER (OUTPATIENT)
Dept: HOSPITAL 75 - RAD | Age: 61
End: 2021-02-03
Attending: INTERNAL MEDICINE
Payer: MEDICAID

## 2021-02-03 DIAGNOSIS — Z82.49: ICD-10-CM

## 2021-02-03 DIAGNOSIS — R07.9: Primary | ICD-10-CM

## 2021-02-03 LAB
BUN/CREAT SERPL: 17
CREAT SERPL-MCNC: 0.77 MG/DL (ref 0.6–1.3)
GFR SERPLBLD BASED ON 1.73 SQ M-ARVRAT: > 60 ML/MIN

## 2021-02-03 PROCEDURE — 36415 COLL VENOUS BLD VENIPUNCTURE: CPT

## 2021-02-03 PROCEDURE — 84520 ASSAY OF UREA NITROGEN: CPT

## 2021-02-03 PROCEDURE — 82565 ASSAY OF CREATININE: CPT

## 2021-02-03 PROCEDURE — 71275 CT ANGIOGRAPHY CHEST: CPT

## 2021-02-03 NOTE — DIAGNOSTIC IMAGING REPORT
PROCEDURE: CT angiography of the chest with contrast.



TECHNIQUE: Multiple contiguous axial images were obtained through

the chest after uneventful bolus administration of intravenous

contrast. 3D reconstructed CTA MIP acquisitions were also

performed.

Auto Exposure Controls were utilized during the CT exam to meet

ALARA standards for radiation dose reduction. 



INDICATION: Chest pain and family history of aortic aneurysm.



Correlation is made with noncontrast CT chest from 12/12/2020.



The ascending thoracic aorta measures 3.6 cm in AP diameter.

Aortic arch is normal caliber. The descending thoracic aorta

appears to be normal caliber. No dissection is identified. No

axillary lymphadenopathy is identified. No definite mediastinal

or hilar lymphadenopathy is detected. There is no pericardial or

pleural fluid. Lungs are clear of acute infiltrates. No mass is

detected. There is no nodule. Upper abdomen is unremarkable.



IMPRESSION: Unremarkable CT angiogram of the chest. There is no

evidence of thoracic aortic aneurysm or aortic dissection.



Dictated by: 



  Dictated on workstation # [id number]

## 2021-08-10 ENCOUNTER — HOSPITAL ENCOUNTER (OUTPATIENT)
Dept: HOSPITAL 75 - CARD | Age: 61
End: 2021-08-10
Attending: PHYSICIAN ASSISTANT
Payer: MEDICAID

## 2021-08-10 DIAGNOSIS — I49.9: Primary | ICD-10-CM

## 2021-08-10 PROCEDURE — 93226 XTRNL ECG REC<48 HR SCAN A/R: CPT

## 2021-08-10 PROCEDURE — 93225 XTRNL ECG REC<48 HRS REC: CPT

## 2021-10-08 ENCOUNTER — HOSPITAL ENCOUNTER (OUTPATIENT)
Dept: HOSPITAL 75 - PREOP | Age: 61
LOS: 3 days | Discharge: HOME | End: 2021-10-11
Attending: SURGERY
Payer: MEDICAID

## 2021-10-08 VITALS — BODY MASS INDEX: 22.68 KG/M2 | HEIGHT: 65.98 IN | WEIGHT: 141.1 LBS

## 2021-10-08 DIAGNOSIS — K21.9: ICD-10-CM

## 2021-10-08 DIAGNOSIS — Z20.822: ICD-10-CM

## 2021-10-08 DIAGNOSIS — Z01.812: Primary | ICD-10-CM

## 2021-10-08 PROCEDURE — 87635 SARS-COV-2 COVID-19 AMP PRB: CPT

## 2021-10-12 ENCOUNTER — HOSPITAL ENCOUNTER (OUTPATIENT)
Dept: HOSPITAL 75 - ENDO | Age: 61
Discharge: HOME | End: 2021-10-12
Attending: SURGERY
Payer: MEDICAID

## 2021-10-12 VITALS — DIASTOLIC BLOOD PRESSURE: 53 MMHG | SYSTOLIC BLOOD PRESSURE: 93 MMHG

## 2021-10-12 VITALS — BODY MASS INDEX: 22.68 KG/M2 | HEIGHT: 65.98 IN | WEIGHT: 141.1 LBS

## 2021-10-12 VITALS — SYSTOLIC BLOOD PRESSURE: 102 MMHG | DIASTOLIC BLOOD PRESSURE: 77 MMHG

## 2021-10-12 VITALS — DIASTOLIC BLOOD PRESSURE: 78 MMHG | SYSTOLIC BLOOD PRESSURE: 114 MMHG

## 2021-10-12 VITALS — SYSTOLIC BLOOD PRESSURE: 96 MMHG | DIASTOLIC BLOOD PRESSURE: 56 MMHG

## 2021-10-12 VITALS — SYSTOLIC BLOOD PRESSURE: 127 MMHG | DIASTOLIC BLOOD PRESSURE: 80 MMHG

## 2021-10-12 VITALS — SYSTOLIC BLOOD PRESSURE: 114 MMHG | DIASTOLIC BLOOD PRESSURE: 78 MMHG

## 2021-10-12 DIAGNOSIS — G47.00: ICD-10-CM

## 2021-10-12 DIAGNOSIS — K44.9: Primary | ICD-10-CM

## 2021-10-12 DIAGNOSIS — K63.5: ICD-10-CM

## 2021-10-12 DIAGNOSIS — Z88.5: ICD-10-CM

## 2021-10-12 DIAGNOSIS — K21.9: ICD-10-CM

## 2021-10-12 DIAGNOSIS — F17.210: ICD-10-CM

## 2021-10-12 DIAGNOSIS — F41.9: ICD-10-CM

## 2021-10-12 DIAGNOSIS — Z79.899: ICD-10-CM

## 2021-10-12 DIAGNOSIS — F32.A: ICD-10-CM

## 2021-10-12 NOTE — DISCHARGE INST-SIMPLE/STANDARD
Discharge Inst-Standard


Patient Instructions/Follow Up


Plan of Care/Instructions/FU:  


2 weeks Jennifer


Activity as Tolerated:  Yes


Discharge Diet:  Regular Diet (high fiber)











TOBY BYRD DO              Oct 12, 2021 10:28 Island Pedicle Flap With Canthal Suspension Text: The defect edges were debeveled with a #15 scalpel blade.  Given the location of the defect, shape of the defect and the proximity to free margins an island pedicle advancement flap was deemed most appropriate.  Using a sterile surgical marker, an appropriate advancement flap was drawn incorporating the defect, outlining the appropriate donor tissue and placing the expected incisions within the relaxed skin tension lines where possible. The area thus outlined was incised deep to adipose tissue with a #15 scalpel blade.  The skin margins were undermined to an appropriate distance in all directions around the primary defect and laterally outward around the island pedicle utilizing iris scissors.  There was minimal undermining beneath the pedicle flap. A suspension suture was placed in the canthal tendon to prevent tension and prevent ectropion.

## 2021-10-12 NOTE — OPERATIVE REPORT
DATE OF SERVICE:  10/12/2021



PREOPERATIVE DIAGNOSES:

Gastroesophageal reflux disease and change in bowel habits.



POSTOPERATIVE DIAGNOSES:

Hiatal hernia, colon polyps.



PROCEDURE:

EGD with biopsies, colonoscopy with cold biopsy polypectomy x2.



SURGEON:

Toby Rodriguez DO



ANESTHESIA:

Per MDA.



ESTIMATED BLOOD LOSS:

None.



COMPLICATIONS:

None.



INDICATIONS:

The patient is a 61-year-old female with GERD and change in bowel habits.  She

understands risks and benefits of procedure and wished to proceed with

procedure.  Consent was signed in the chart.



DESCRIPTION OF PROCEDURE:

The patient was taken to the endoscopy suite, placed in left lateral recumbent

position.  Timeout was performed.  Scope was inserted in mouth, down the

esophagus, stomach and into the duodenum without difficulty.  There were no

polyps, masses or ulcerations within the duodenum.  Scope was slowly retracted

the stomach where it was further insufflated.  No polyps, masses or ulcerations.

 Biopsy of the antrum was obtained.  Scope was retroflexed, hiatal hernia noted.

 No other pathology.  Scope was returned to its normal position, slowly

withdrawn to distal esophagus.  Biopsy of the GE junction was obtained.  Scope

was then slowly retracted back noting no other pathology.  Digital rectal exam

was performed.  There were no palpable polyps, masses or ulcerations.  Scope was

inserted in the rectum and advanced all the way to cecum with minimal

difficulty.  Prep was adequate.  Scope was slowly retracted back.  There were no

polyps, masses or ulcerations within the cecum, ascending, transverse,

descending and sigmoid colon except for two very small polyps in the distal

sigmoid colon, which cold biopsy polypectomy was performed on these.  Scope was

continuously retracted back into the rectum where it was also retroflexed noting

no other pathology.  Scope was retroflexed noting no other pathology.  Scope was

returned to its normal position, slowly withdrawn until completely removed.  The

patient tolerated procedure well without any complications.  She was taken to

recovery room in stable condition.



RECOMMENDATIONS:

The patient will follow up in the office to discuss results.  Noting hiatal

hernia and colon polyps.  The patient will need repeat colonoscopy in 5 years. 

Any issues before that be seen at that time.  We will try to get GERD symptoms

under control.  We would also possibly consider hiatal hernia repair if symptoms

not able to get control.



CC:  [facility] -- requested, unable to deliver.





Job ID: [id number]

DocumentID: [id number]

Dictated Date:  10/12/2021 10:38:03

Transcription Date: 10/12/2021 17:47:14

Dictated By: TOBY RODRIGUEZ DO

## 2021-10-12 NOTE — PROGRESS NOTE-POST OPERATIVE
Post-Operative Progess Note


Surgeon (s)/Assistant (s)


Surgeon


TOBY BYRD DO


Assistant:  na





Pre-Operative Diagnosis


gerd, change in bowel habits





Post-Operative Diagnosis





hiatal hernia, colon polyps





Procedure & Operative Findings


Date of Procedure


10/12/21


Procedure Performed/Findings


egd c biopsies, colonoscopy c cold biopsy polypectomy


Anesthesia Type


per [facility]





Estimated Blood Loss


Estimated blood loss (mL):  na





Specimens/Packing


Specimens Removed


antrum, ge, sigmoid polyps











TOBY BYRD DO              Oct 12, 2021 10:22

## 2021-10-12 NOTE — ANESTHESIA-GENERAL POST-OP
MAC


Patient Condition


Mental Status/LOC:  Same as Preop


Cardiovascular:  Satisfactory


Nausea/Vomiting:  Absent


Respiratory:  Satisfactory


Pain:  Controlled


Complications:  Absent





Post Op Complications


Complications


None





Follow Up Care/Instructions


Patient Instructions


None needed.





Anesthesiology Discharge Order


Discharge Order


Patient is doing well, no complaints, stable vital signs, no apparent adverse 

anesthesia problems.











BREN MONTENEGRO DO         Oct 12, 2021 11:44

## 2022-05-09 ENCOUNTER — HOSPITAL ENCOUNTER (EMERGENCY)
Dept: HOSPITAL 75 - ER | Age: 62
Discharge: HOME | End: 2022-05-09
Payer: MEDICAID

## 2022-05-09 VITALS — DIASTOLIC BLOOD PRESSURE: 78 MMHG | SYSTOLIC BLOOD PRESSURE: 143 MMHG

## 2022-05-09 VITALS — WEIGHT: 129.85 LBS | BODY MASS INDEX: 20.87 KG/M2 | HEIGHT: 66.02 IN

## 2022-05-09 DIAGNOSIS — M25.552: Primary | ICD-10-CM
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
## 2022-05-09 LAB
APTT PPP: YELLOW S
BACTERIA #/AREA URNS HPF: NEGATIVE /HPF
BILIRUB UR QL STRIP: NEGATIVE
FIBRINOGEN PPP-MCNC: CLEAR MG/DL
GLUCOSE UR STRIP-MCNC: NEGATIVE MG/DL
KETONES UR QL STRIP: NEGATIVE
LEUKOCYTE ESTERASE UR QL STRIP: (no result)
NITRITE UR QL STRIP: NEGATIVE
PH UR STRIP: 6.5 [PH] (ref 5–9)
PROT UR QL STRIP: NEGATIVE
RBC #/AREA URNS HPF: (no result) /HPF
SP GR UR STRIP: <=1.005 (ref 1.02–1.02)
SQUAMOUS #/AREA URNS HPF: (no result) /HPF
WBC #/AREA URNS HPF: (no result) /HPF

## 2022-05-09 PROCEDURE — 74176 CT ABD & PELVIS W/O CONTRAST: CPT

## 2022-05-09 PROCEDURE — 81000 URINALYSIS NONAUTO W/SCOPE: CPT
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
## 2022-05-09 NOTE — DIAGNOSTIC IMAGING REPORT
PROCEDURE: CT abdomen and pelvis without contrast.



TECHNIQUE: Multiple contiguous axial images were obtained through

the abdomen and pelvis without the use of intravenous contrast.

Auto Exposure Controls were utilized during the CT exam to meet

ALARA standards for radiation dose reduction. 



INDICATION: Back pain radiating into the left groin.



COMPARISON: No priors.



FINDINGS: No opaque urinary tract calculi. There is no

hydroureteronephrosis. Liver, gallbladder, bile ducts, spleen,

adrenals, and pancreas are all unremarkable at this unenhanced

exam. Aortic atherosclerotic vascular calcifications are

nonaneurysmal. There are no findings of appendicitis or focal

diverticulitis. The urinary bladder is unremarkable. The uterus

and adnexa are unremarkable. No pneumatosis. No free gas. There

are degenerative changes to the lumbar spine, pelvis, and hips;

no acute appearing bony pathology. The lung bases are nonacute.

There is no free fluid. There are no findings of intra- or

retro-peritoneal hemorrhage.



IMPRESSION:



No hemorrhage, fracture, obstruction, inflammatory process, or

other acute/post-traumatic abnormalities.



Dictated by: 



  Dictated on workstation # [id number]

## 2022-12-21 ENCOUNTER — HOSPITAL ENCOUNTER (OUTPATIENT)
Dept: HOSPITAL 75 - CARD | Age: 62
End: 2022-12-21
Attending: PHYSICIAN ASSISTANT
Payer: MEDICAID

## 2022-12-21 VITALS — DIASTOLIC BLOOD PRESSURE: 68 MMHG | SYSTOLIC BLOOD PRESSURE: 134 MMHG

## 2022-12-21 DIAGNOSIS — I10: ICD-10-CM

## 2022-12-21 DIAGNOSIS — R07.9: Primary | ICD-10-CM

## 2022-12-21 PROCEDURE — 78452 HT MUSCLE IMAGE SPECT MULT: CPT

## 2022-12-21 PROCEDURE — 93017 CV STRESS TEST TRACING ONLY: CPT

## 2022-12-21 NOTE — CARDIOLOGY STRESS TEST REPORT
Stress Test Report


Date of Procedure/Referring:


Date of Procedure:  Dec 21, 2022


[facility]


Admitting Physician


Admitting Physician:


 








Attending Physician:


Dixie Manzo





Indications:


HTN





Baseline Heart Rate:


51





Baseline Blood Pressure:


Blood Pressure Systolic:  134


Blood Pressure Diastolic:  68


Baseline Vitals





Vital Signs








  Date Time  Temp Pulse Resp B/P (MAP) Pulse Ox O2 Delivery O2 Flow Rate FiO2


 


12/21/22 09:34  54  134/68 (90)    











Baseline EKG:


Baseline EKG:  NSR





Summary


After explaining the procedure to the patient, she  signed a consent and then 

brought to the stress nuclear laboratory.


Patient received 0.4 mg Lexiscan for stress test, ECG, heart rate and blood 

pressure were monitored continuously.  Resting and stress dose of radio tracer 

were injected, imaging was acquired and reviewed in short axis, horizontal long 

axis and vertical long axis views.


SSS:  2


SDS:  0


EF:  70


1.  Patient had chest pain during Lexiscan injection.  Continue to have chest 

pain did not respond to nitroglycerin.  No EKG changes were noted.


2.  No significant ischemia or infarction on SPECT images


3.  Normal left ventricular size, ejection fraction 70%





Copy


Copies To 1:   [facility]/QUINTEN MARIA MD              Dec 21, 2022 12:15

## 2022-12-27 ENCOUNTER — HOSPITAL ENCOUNTER (OUTPATIENT)
Dept: HOSPITAL 75 - CARD | Age: 62
End: 2022-12-27
Attending: PHYSICIAN ASSISTANT
Payer: MEDICAID

## 2022-12-27 DIAGNOSIS — I25.10: ICD-10-CM

## 2022-12-27 DIAGNOSIS — I10: Primary | ICD-10-CM

## 2022-12-27 PROCEDURE — 93306 TTE W/DOPPLER COMPLETE: CPT
